# Patient Record
Sex: MALE | Race: WHITE | NOT HISPANIC OR LATINO | URBAN - METROPOLITAN AREA
[De-identification: names, ages, dates, MRNs, and addresses within clinical notes are randomized per-mention and may not be internally consistent; named-entity substitution may affect disease eponyms.]

---

## 2021-02-25 ENCOUNTER — OFFICE (OUTPATIENT)
Dept: URBAN - METROPOLITAN AREA CLINIC 75 | Facility: CLINIC | Age: 36
End: 2021-02-25
Payer: MEDICAID

## 2021-02-25 VITALS
OXYGEN SATURATION: 97 % | RESPIRATION RATE: 12 BRPM | WEIGHT: 302 LBS | TEMPERATURE: 97.3 F | HEART RATE: 85 BPM | HEIGHT: 70 IN

## 2021-02-25 DIAGNOSIS — R13.10 DYSPHAGIA, UNSPECIFIED: ICD-10-CM

## 2021-02-25 DIAGNOSIS — K21.9 GASTRO-ESOPHAGEAL REFLUX DISEASE WITHOUT ESOPHAGITIS: ICD-10-CM

## 2021-02-25 DIAGNOSIS — R11.10 VOMITING, UNSPECIFIED: ICD-10-CM

## 2021-02-25 DIAGNOSIS — K59.00 CONSTIPATION, UNSPECIFIED: ICD-10-CM

## 2021-02-25 DIAGNOSIS — R14.2 ERUCTATION: ICD-10-CM

## 2021-02-25 PROCEDURE — 99204 OFFICE O/P NEW MOD 45 MIN: CPT | Performed by: INTERNAL MEDICINE

## 2021-02-25 RX ORDER — PANTOPRAZOLE SODIUM 40 MG/1
TABLET, DELAYED RELEASE ORAL
Qty: 30 | Refills: 6 | Status: COMPLETED
End: 2021-07-29

## 2021-03-03 ENCOUNTER — TRANSCRIBE ORDERS (OUTPATIENT)
Dept: ADMINISTRATIVE | Facility: HOSPITAL | Age: 36
End: 2021-03-03

## 2021-03-03 DIAGNOSIS — K21.9 GASTRIC REFLUX: Primary | ICD-10-CM

## 2021-07-29 ENCOUNTER — OFFICE (OUTPATIENT)
Dept: URBAN - METROPOLITAN AREA CLINIC 75 | Facility: CLINIC | Age: 36
End: 2021-07-29
Payer: MEDICAID

## 2021-07-29 VITALS
HEIGHT: 70 IN | OXYGEN SATURATION: 91 % | WEIGHT: 291 LBS | HEART RATE: 82 BPM | SYSTOLIC BLOOD PRESSURE: 124 MMHG | TEMPERATURE: 97.3 F | DIASTOLIC BLOOD PRESSURE: 78 MMHG | RESPIRATION RATE: 100 BRPM

## 2021-07-29 DIAGNOSIS — R14.2 ERUCTATION: ICD-10-CM

## 2021-07-29 DIAGNOSIS — K21.9 GASTRO-ESOPHAGEAL REFLUX DISEASE WITHOUT ESOPHAGITIS: ICD-10-CM

## 2021-07-29 DIAGNOSIS — R14.0 ABDOMINAL DISTENSION (GASEOUS): ICD-10-CM

## 2021-07-29 DIAGNOSIS — K59.00 CONSTIPATION, UNSPECIFIED: ICD-10-CM

## 2021-07-29 PROCEDURE — 99214 OFFICE O/P EST MOD 30 MIN: CPT | Performed by: INTERNAL MEDICINE

## 2021-07-29 RX ORDER — PANTOPRAZOLE SODIUM 40 MG/1
TABLET, DELAYED RELEASE ORAL
Qty: 30 | Refills: 3 | Status: COMPLETED
End: 2023-10-05

## 2022-09-26 ENCOUNTER — HOSPITAL ENCOUNTER (EMERGENCY)
Facility: HOSPITAL | Age: 37
Discharge: HOME | End: 2022-09-26
Attending: EMERGENCY MEDICINE | Admitting: EMERGENCY MEDICINE

## 2022-09-26 ENCOUNTER — APPOINTMENT (OUTPATIENT)
Dept: CT IMAGING | Facility: HOSPITAL | Age: 37
End: 2022-09-26

## 2022-09-26 VITALS
TEMPERATURE: 98.3 F | SYSTOLIC BLOOD PRESSURE: 153 MMHG | WEIGHT: 290 LBS | RESPIRATION RATE: 18 BRPM | DIASTOLIC BLOOD PRESSURE: 92 MMHG | HEART RATE: 77 BPM | OXYGEN SATURATION: 96 % | BODY MASS INDEX: 41.52 KG/M2 | HEIGHT: 70 IN

## 2022-09-26 DIAGNOSIS — R10.9 ACUTE RIGHT FLANK PAIN: ICD-10-CM

## 2022-09-26 DIAGNOSIS — R91.1 LUNG NODULE: ICD-10-CM

## 2022-09-26 DIAGNOSIS — N20.0 KIDNEY STONES: Primary | ICD-10-CM

## 2022-09-26 LAB
ALBUMIN SERPL-MCNC: 4.3 G/DL (ref 3.5–5.2)
ALBUMIN/GLOB SERPL: 1.5 G/DL
ALP SERPL-CCNC: 68 U/L (ref 39–117)
ALT SERPL W P-5'-P-CCNC: 28 U/L (ref 1–41)
ANION GAP SERPL CALCULATED.3IONS-SCNC: 6.8 MMOL/L (ref 5–15)
AST SERPL-CCNC: 19 U/L (ref 1–40)
BACTERIA UR QL AUTO: ABNORMAL /HPF
BASOPHILS # BLD AUTO: 0.06 10*3/MM3 (ref 0–0.2)
BASOPHILS NFR BLD AUTO: 0.6 % (ref 0–1.5)
BILIRUB SERPL-MCNC: 0.5 MG/DL (ref 0–1.2)
BILIRUB UR QL STRIP: NEGATIVE
BUN SERPL-MCNC: 16 MG/DL (ref 6–20)
BUN/CREAT SERPL: 14.4 (ref 7–25)
CALCIUM SPEC-SCNC: 8.7 MG/DL (ref 8.6–10.5)
CHLORIDE SERPL-SCNC: 100 MMOL/L (ref 98–107)
CLARITY UR: CLEAR
CO2 SERPL-SCNC: 31.2 MMOL/L (ref 22–29)
COLOR UR: YELLOW
CREAT SERPL-MCNC: 1.11 MG/DL (ref 0.76–1.27)
DEPRECATED RDW RBC AUTO: 41.7 FL (ref 37–54)
EGFRCR SERPLBLD CKD-EPI 2021: 87.2 ML/MIN/1.73
EOSINOPHIL # BLD AUTO: 0.6 10*3/MM3 (ref 0–0.4)
EOSINOPHIL NFR BLD AUTO: 6.2 % (ref 0.3–6.2)
ERYTHROCYTE [DISTWIDTH] IN BLOOD BY AUTOMATED COUNT: 13.3 % (ref 12.3–15.4)
GLOBULIN UR ELPH-MCNC: 2.9 GM/DL
GLUCOSE SERPL-MCNC: 121 MG/DL (ref 65–99)
GLUCOSE UR STRIP-MCNC: NEGATIVE MG/DL
HCT VFR BLD AUTO: 43.5 % (ref 37.5–51)
HGB BLD-MCNC: 14.1 G/DL (ref 13–17.7)
HGB UR QL STRIP.AUTO: ABNORMAL
HOLD SPECIMEN: NORMAL
HOLD SPECIMEN: NORMAL
HYALINE CASTS UR QL AUTO: ABNORMAL /LPF
IMM GRANULOCYTES # BLD AUTO: 0.04 10*3/MM3 (ref 0–0.05)
IMM GRANULOCYTES NFR BLD AUTO: 0.4 % (ref 0–0.5)
KETONES UR QL STRIP: ABNORMAL
LEUKOCYTE ESTERASE UR QL STRIP.AUTO: ABNORMAL
LIPASE SERPL-CCNC: 17 U/L (ref 13–60)
LYMPHOCYTES # BLD AUTO: 2.46 10*3/MM3 (ref 0.7–3.1)
LYMPHOCYTES NFR BLD AUTO: 25.5 % (ref 19.6–45.3)
MCH RBC QN AUTO: 27.5 PG (ref 26.6–33)
MCHC RBC AUTO-ENTMCNC: 32.4 G/DL (ref 31.5–35.7)
MCV RBC AUTO: 84.8 FL (ref 79–97)
MONOCYTES # BLD AUTO: 0.53 10*3/MM3 (ref 0.1–0.9)
MONOCYTES NFR BLD AUTO: 5.5 % (ref 5–12)
NEUTROPHILS NFR BLD AUTO: 5.96 10*3/MM3 (ref 1.7–7)
NEUTROPHILS NFR BLD AUTO: 61.8 % (ref 42.7–76)
NITRITE UR QL STRIP: NEGATIVE
NRBC BLD AUTO-RTO: 0 /100 WBC (ref 0–0.2)
PH UR STRIP.AUTO: 6.5 [PH] (ref 5–8)
PLATELET # BLD AUTO: 196 10*3/MM3 (ref 140–450)
PMV BLD AUTO: 10.6 FL (ref 6–12)
POTASSIUM SERPL-SCNC: 4.2 MMOL/L (ref 3.5–5.2)
PROT SERPL-MCNC: 7.2 G/DL (ref 6–8.5)
PROT UR QL STRIP: ABNORMAL
RBC # BLD AUTO: 5.13 10*6/MM3 (ref 4.14–5.8)
RBC # UR STRIP: ABNORMAL /HPF
REF LAB TEST METHOD: ABNORMAL
SODIUM SERPL-SCNC: 138 MMOL/L (ref 136–145)
SP GR UR STRIP: >=1.03 (ref 1–1.03)
SQUAMOUS #/AREA URNS HPF: ABNORMAL /HPF
UROBILINOGEN UR QL STRIP: ABNORMAL
WBC # UR STRIP: ABNORMAL /HPF
WBC NRBC COR # BLD: 9.65 10*3/MM3 (ref 3.4–10.8)
WHOLE BLOOD HOLD COAG: NORMAL
WHOLE BLOOD HOLD SPECIMEN: NORMAL

## 2022-09-26 PROCEDURE — 74176 CT ABD & PELVIS W/O CONTRAST: CPT

## 2022-09-26 PROCEDURE — 80053 COMPREHEN METABOLIC PANEL: CPT | Performed by: EMERGENCY MEDICINE

## 2022-09-26 PROCEDURE — 83690 ASSAY OF LIPASE: CPT | Performed by: EMERGENCY MEDICINE

## 2022-09-26 PROCEDURE — 81001 URINALYSIS AUTO W/SCOPE: CPT | Performed by: EMERGENCY MEDICINE

## 2022-09-26 PROCEDURE — 99283 EMERGENCY DEPT VISIT LOW MDM: CPT

## 2022-09-26 PROCEDURE — 85025 COMPLETE CBC W/AUTO DIFF WBC: CPT | Performed by: EMERGENCY MEDICINE

## 2022-09-26 RX ORDER — ONDANSETRON 4 MG/1
TABLET, ORALLY DISINTEGRATING ORAL
Qty: 20 TABLET | Refills: 0 | Status: SHIPPED | OUTPATIENT
Start: 2022-09-26

## 2022-09-26 RX ORDER — SODIUM CHLORIDE 0.9 % (FLUSH) 0.9 %
10 SYRINGE (ML) INJECTION AS NEEDED
Status: DISCONTINUED | OUTPATIENT
Start: 2022-09-26 | End: 2022-09-27 | Stop reason: HOSPADM

## 2022-09-27 NOTE — ED PROVIDER NOTES
EMERGENCY DEPARTMENT ENCOUNTER    Room Number:  22/22  Date of encounter:  9/26/2022  PCP: Jamilah Hernandez APRN  Historian: Patient    Patient was placed in face mask during triage process. Patient was wearing facemask when I entered the room and throughout our encounter. I wore full protective equipment throughout this patient encounter including a face mask, eye protection, and gloves. Hand hygiene was performed before donning protective equipment and again following doffing of PPE after leaving the room.    HPI:  Chief Complaint: Right flank pain  A complete HPI/ROS/PMH/PSH/SH/FH are unobtainable due to: N/A   Context: Dale Marr is a 37 y.o. male who presents to the ED c/o waxing waning right flank pain for nearly a month.  Occasionally radiates into right groin and testicle.  No reported testicular swelling.  Some urinary urgency/dysuria.  No known trauma.  No nausea or vomiting at this time.  No fevers.  Discomfort currently very mild.  No clear exacerbating or relieving factors identified.  No history of similar before 1 month ago.  Patient denies any penile discharge, testicular swelling or mass, history of STDs or exposures to STDs or discomfort with ejaculation.      MEDICAL HISTORY REVIEW  EMR reviewed:    PAST MEDICAL HISTORY  Active Ambulatory Problems     Diagnosis Date Noted   • No Active Ambulatory Problems     Resolved Ambulatory Problems     Diagnosis Date Noted   • No Resolved Ambulatory Problems     Past Medical History:   Diagnosis Date   • Asthma    • GERD (gastroesophageal reflux disease)          PAST SURGICAL HISTORY  Past Surgical History:   Procedure Laterality Date   • APPENDECTOMY           FAMILY HISTORY  No family history on file.      SOCIAL HISTORY  Social History     Socioeconomic History   • Marital status: Single   Tobacco Use   • Smoking status: Never Smoker   Substance and Sexual Activity   • Alcohol use: No   • Drug use: Yes     Types: Marijuana     Comment: Heroin  "\"that I snort\"    • Sexual activity: Defer         ALLERGIES  Patient has no known allergies.        REVIEW OF SYSTEMS  Review of Systems     All systems reviewed and negative except for those discussed in HPI.       PHYSICAL EXAM    I have reviewed the triage vital signs and nursing notes.    ED Triage Vitals [09/26/22 2110]   Temp Heart Rate Resp BP SpO2   98.3 °F (36.8 °C) 81 18 141/83 96 %      Temp src Heart Rate Source Patient Position BP Location FiO2 (%)   Oral Monitor Standing Right arm --       Physical Exam    Physical Exam   Constitutional: No distress.  Nontoxic.  Not overtly uncomfortable appearing   HENT:  Head: Normocephalic and atraumatic.   Oropharynx: Mucous membranes are moist.   Eyes: No scleral icterus. No conjunctival pallor.  Neck: Painless range of motion noted. Neck supple.   Cardiovascular: Normal rate, regular rhythm and intact distal pulses.  Pulmonary/Chest: No respiratory distress. There are no wheezes, no rhonchi, and no rales.   Abdominal: Large habitus, soft without focal tenderness.  No rebound or rigidity.  : Normal external anatomy.  No penile discharge or lesions.  No testicular tenderness, swelling, or masses.  No inguinal hernia appreciated.  No tenderness with deep palpation over the perineum to suggest acute prostatitis.  Musculoskeletal: Moves all extremities equally. There is no pedal edema or calf tenderness.   Neurological: Alert.  Baseline strength and sensation noted.   Skin: Skin is pink, warm, and dry. No pallor.   Psychiatric: Mood and affect normal.   Nursing note and vitals reviewed.    LAB RESULTS  Recent Results (from the past 24 hour(s))   Comprehensive Metabolic Panel    Collection Time: 09/26/22  9:54 PM    Specimen: Blood   Result Value Ref Range    Glucose 121 (H) 65 - 99 mg/dL    BUN 16 6 - 20 mg/dL    Creatinine 1.11 0.76 - 1.27 mg/dL    Sodium 138 136 - 145 mmol/L    Potassium 4.2 3.5 - 5.2 mmol/L    Chloride 100 98 - 107 mmol/L    CO2 31.2 (H) 22.0 - " 29.0 mmol/L    Calcium 8.7 8.6 - 10.5 mg/dL    Total Protein 7.2 6.0 - 8.5 g/dL    Albumin 4.30 3.50 - 5.20 g/dL    ALT (SGPT) 28 1 - 41 U/L    AST (SGOT) 19 1 - 40 U/L    Alkaline Phosphatase 68 39 - 117 U/L    Total Bilirubin 0.5 0.0 - 1.2 mg/dL    Globulin 2.9 gm/dL    A/G Ratio 1.5 g/dL    BUN/Creatinine Ratio 14.4 7.0 - 25.0    Anion Gap 6.8 5.0 - 15.0 mmol/L    eGFR 87.2 >60.0 mL/min/1.73   Lipase    Collection Time: 09/26/22  9:54 PM    Specimen: Blood   Result Value Ref Range    Lipase 17 13 - 60 U/L   Green Top (Gel)    Collection Time: 09/26/22  9:54 PM   Result Value Ref Range    Extra Tube Hold for add-ons.    Lavender Top    Collection Time: 09/26/22  9:54 PM   Result Value Ref Range    Extra Tube hold for add-on    Gold Top - SST    Collection Time: 09/26/22  9:54 PM   Result Value Ref Range    Extra Tube Hold for add-ons.    Light Blue Top    Collection Time: 09/26/22  9:54 PM   Result Value Ref Range    Extra Tube Hold for add-ons.    CBC Auto Differential    Collection Time: 09/26/22  9:54 PM    Specimen: Blood   Result Value Ref Range    WBC 9.65 3.40 - 10.80 10*3/mm3    RBC 5.13 4.14 - 5.80 10*6/mm3    Hemoglobin 14.1 13.0 - 17.7 g/dL    Hematocrit 43.5 37.5 - 51.0 %    MCV 84.8 79.0 - 97.0 fL    MCH 27.5 26.6 - 33.0 pg    MCHC 32.4 31.5 - 35.7 g/dL    RDW 13.3 12.3 - 15.4 %    RDW-SD 41.7 37.0 - 54.0 fl    MPV 10.6 6.0 - 12.0 fL    Platelets 196 140 - 450 10*3/mm3    Neutrophil % 61.8 42.7 - 76.0 %    Lymphocyte % 25.5 19.6 - 45.3 %    Monocyte % 5.5 5.0 - 12.0 %    Eosinophil % 6.2 0.3 - 6.2 %    Basophil % 0.6 0.0 - 1.5 %    Immature Grans % 0.4 0.0 - 0.5 %    Neutrophils, Absolute 5.96 1.70 - 7.00 10*3/mm3    Lymphocytes, Absolute 2.46 0.70 - 3.10 10*3/mm3    Monocytes, Absolute 0.53 0.10 - 0.90 10*3/mm3    Eosinophils, Absolute 0.60 (H) 0.00 - 0.40 10*3/mm3    Basophils, Absolute 0.06 0.00 - 0.20 10*3/mm3    Immature Grans, Absolute 0.04 0.00 - 0.05 10*3/mm3    nRBC 0.0 0.0 - 0.2 /100 WBC    Urinalysis With Microscopic If Indicated (No Culture) - Urine, Clean Catch    Collection Time: 09/26/22  9:55 PM    Specimen: Urine, Clean Catch   Result Value Ref Range    Color, UA Yellow Yellow, Straw    Appearance, UA Clear Clear    pH, UA 6.5 5.0 - 8.0    Specific Gravity, UA >=1.030 1.005 - 1.030    Glucose, UA Negative Negative    Ketones, UA Trace (A) Negative    Bilirubin, UA Negative Negative    Blood, UA Large (3+) (A) Negative    Protein, UA Trace (A) Negative    Leuk Esterase, UA Trace (A) Negative    Nitrite, UA Negative Negative    Urobilinogen, UA 1.0 E.U./dL 0.2 - 1.0 E.U./dL   Urinalysis, Microscopic Only - Urine, Clean Catch    Collection Time: 09/26/22  9:55 PM    Specimen: Urine, Clean Catch   Result Value Ref Range    RBC, UA Too Numerous to Count (A) None Seen, 0-2 /HPF    WBC, UA 6-12 (A) None Seen, 0-2 /HPF    Bacteria, UA None Seen None Seen /HPF    Squamous Epithelial Cells, UA 3-6 (A) None Seen, 0-2 /HPF    Hyaline Casts, UA 3-6 None Seen /LPF    Methodology Automated Microscopy        Ordered the above labs and independently reviewed the results.        RADIOLOGY  CT Abdomen Pelvis Stone Protocol    Result Date: 9/26/2022  CT ABDOMEN AND PELVIS WITHOUT IV CONTRAST  HISTORY: Right flank pain with radiating symptoms into the groin  TECHNIQUE:  CT includes axial imaging from the lung bases to the trochanters without intravenous contrast and without use of oral contrast. Data reconstructed in coronal and sagittal planes. Radiation dose reduction techniques were utilized, including automated exposure control and exposure modulation based on body size.  COMPARISON: None  FINDINGS: Within the left lower lobe there is a noncalcified nodule that measures 9 mm. Lung bases are otherwise clear.  There is diffuse fat infiltration of the liver. Gallbladder, spleen, adrenal glands, partially fat replaced pancreas exhibit normal noncontrasted CT appearance. Left kidney appears normal. There are 2  adjacent right mid to lower pole 4 mm and 2 mm stones. There is a 1 mm right lower pole renal stone. There is no ureteral stone or hydronephrosis. The urinary bladder is mostly decompressed.  There is no jeffery enlargement in the abdomen or pelvis. There is no bowel dilatation or evidence for bowel obstruction. There has been previous appendectomy. There is an old healed fracture of the right lateral 9th rib.      1. 9 mm left lower lobe noncalcified pulmonary nodule for which further evaluation with chest CT is recommended. 2. Small nonobstructing right renal stones. No ureteral stone or evidence for obstructive uropathy. 3. Diffuse fat infiltration of the liver. 4. Previous appendectomy.  Discussed with Dr. Villasenor in the emergency department on 09/26/2022 at 10:30 PM.  Radiation dose reduction techniques were utilized, including automated exposure control and exposure modulation based on body size.  This report was finalized on 9/26/2022 10:58 PM by Dr. Khris Zaragoza M.D.        I ordered the above noted radiological studies. Reviewed by me and discussed with radiologist.  See dictation for official radiology interpretation.      PROCEDURES    Procedures        MEDICATIONS GIVEN IN ER    Medications   sodium chloride 0.9 % flush 10 mL (has no administration in time range)         PROGRESS, DATA ANALYSIS, CONSULTS, AND MEDICAL DECISION MAKING    My differential diagnosis for back pain includes but is not limited to:  Musculoskeletal strain, contusion, retroperitoneal hematoma, disc protrusion, vertebral fracture, transverse process fracture, rib fracture, facet syndrome, sacroiliac joint strain, sciatica, renal injury, splenic injury, pancreatic injury, osteoarthritis, lumbar spondylosis, spinal stenosis, ankylosing spondylitis, sacroiliac joint inflammation, pancreatitis, perforated peptic ulcer, diverticulitis, endometriosis, chronic PID, epidural abscess, osteomyelitis, retroperitoneal abscess, pyelonephritis,  pneumonia, subphrenic abscess, tuberculosis, neurofibroma, meningioma, multiple myeloma, lymphoma, metastatic cancer, primary cancer, AAA, aortic dissection, spinal ischemia, referred pain, ureterolithiasis    My differential diagnosis for abdominal pain includes but is not limited to:  Gastritis, gastroenteritis, peptic ulcer disease, GERD, esophageal perforation, acute appendicitis, mesenteric adenitis, Meckel’s diverticulum, epiploic appendagitis, diverticulitis, colon cancer, ulcerative colitis, Crohn’s disease, intussusception, small bowel obstruction, adhesions, ischemic bowel, perforated viscus, ileus, obstipation, biliary colic, cholecystitis, cholelithiasis, Ryne-Blaise Jose, hepatitis, pancreatitis, common bile duct obstruction, cholangitis, bile leak, splenic trauma, splenic rupture, splenic infarction, splenic abscess, abdominal abscess, ascites, spontaneous bacterial peritonitis, hernia, UTI, cystitis, prostatitis, ureterolithiasis, urinary obstruction, AAA, myocardial infarction, pneumonia, cancer, porphyria, DKA, medications, sickle cell, viral syndrome, zoster      All labs have been independently reviewed by me.  All radiology studies have been reviewed by me and discussed with radiologist dictating the report.   EKG's independently viewed and interpreted by me.  Discussion below represents my analysis of pertinent findings related to patient's condition, differential diagnosis, treatment plan and final disposition.      ED Course as of 09/26/22 2306   Mon Sep 26, 2022   2233 CT abdomen pelvis reviewed with Dr. Zaragoza, radiologist.  A few small intrarenal stones on the right with no ureteral stone, hydronephrosis or hydroureter noted.  Patient has a fatty liver.  Also noted 9 mm left lower lobe lung nodule for which outpatient follow-up was recommended. [RS]   2234 RBC, UA(!): Too Numerous to Count [RS]   2234 Blood, UA(!): Large (3+) [RS]   2234 Glucose(!): 121 [RS]   2234 BUN: 16 [RS]   2234  Creatinine: 1.11 [RS]   2234 Sodium: 138 [RS]   2234 Potassium: 4.2 [RS]   2234 WBC: 9.65 [RS]   2234 Hemoglobin: 14.1 [RS]   2247 Patient certainly tells a story for passing small kidney stones.  I will recommend ibuprofen as needed for pain, prescription for Zofran for intermittent nausea, straining of urine to see if we can catch a stone, outpatient follow-up with urology.  Patient agreeable discharge planning. [RS]      ED Course User Index  [RS] Alexander Villasenor MD       AS OF 23:06 EDT VITALS:    BP - 153/92  HR - 77  TEMP - 98.3 °F (36.8 °C) (Oral)  O2 SATS - 96%        DIAGNOSIS  Final diagnoses:   Kidney stones   Acute right flank pain   Lung nodule         DISPOSITION  DISCHARGE    Patient discharged in stable condition.    Reviewed implications of results, diagnosis, meds, responsibility to follow up, warning signs and symptoms of possible worsening, potential complications and reasons to return to ER.    Patient/Family voiced understanding of above instructions.    Discussed plan for discharge, as there is no emergent indication for admission. Patient referred to primary care provider for regular health maintenance. Pt/family is agreeable and understands need for follow up and possible repeat testing.  Pt is aware that discharge does not mean that nothing is wrong but it indicates no emergency is present that requires admission and they must continue care with follow-up as given below or physician of their choice.     FOLLOW-UP  Jamilah Hernandez, APRN  [address] [facility] [id number] [phone]    Schedule an appointment as soon as possible for a visit in 1 week  Further discussion and planning for follow-up on lung nodule    Bob Abbott Jr., MD  [address] [facility] [id number] [phone]    Schedule an appointment as soon as possible for a visit in 2 weeks  Next available         Medication List      New Prescriptions    ondansetron ODT 4 MG disintegrating  tablet  Commonly known as: Zofran ODT  Take one tablet by mouth every 6 hours as needed for nausea and vomiting           Where to Get Your Medications      You can get these medications from any pharmacy    Bring a paper prescription for each of these medications  · ondansetron ODT 4 MG disintegrating tablet            Alexander Villasenor MD  09/26/22 3449       Alexander Villasenor MD  09/26/22 0129

## 2022-09-27 NOTE — ED NOTES
Pt arrived to ER from home. Pt reports having intermittent testicle pain. Pt reports that he is has trouble peeing. Pt states that his urine stops and starts and cannot empty bladder fully. Pt reports lower back pain bilaterally. Pt reports burning during urination. These symptoms have been going on for the last several days.

## 2022-11-03 ENCOUNTER — TRANSCRIBE ORDERS (OUTPATIENT)
Dept: ADMINISTRATIVE | Facility: HOSPITAL | Age: 37
End: 2022-11-03

## 2022-11-03 DIAGNOSIS — R91.1 PULMONARY NODULE: Primary | ICD-10-CM

## 2022-11-17 ENCOUNTER — APPOINTMENT (OUTPATIENT)
Dept: CT IMAGING | Facility: HOSPITAL | Age: 37
End: 2022-11-17

## 2023-10-10 ENCOUNTER — PRE-ADMISSION TESTING (OUTPATIENT)
Dept: PREADMISSION TESTING | Facility: HOSPITAL | Age: 38
End: 2023-10-10
Payer: MEDICAID

## 2023-10-10 VITALS
TEMPERATURE: 97.4 F | BODY MASS INDEX: 44.07 KG/M2 | HEIGHT: 70 IN | RESPIRATION RATE: 16 BRPM | WEIGHT: 307.8 LBS | DIASTOLIC BLOOD PRESSURE: 74 MMHG | OXYGEN SATURATION: 97 % | SYSTOLIC BLOOD PRESSURE: 118 MMHG | HEART RATE: 72 BPM

## 2023-10-10 LAB
ANION GAP SERPL CALCULATED.3IONS-SCNC: 11.7 MMOL/L (ref 5–15)
BUN SERPL-MCNC: 12 MG/DL (ref 6–20)
BUN/CREAT SERPL: 10.9 (ref 7–25)
CALCIUM SPEC-SCNC: 8.9 MG/DL (ref 8.6–10.5)
CHLORIDE SERPL-SCNC: 100 MMOL/L (ref 98–107)
CO2 SERPL-SCNC: 30.3 MMOL/L (ref 22–29)
CREAT SERPL-MCNC: 1.1 MG/DL (ref 0.76–1.27)
DEPRECATED RDW RBC AUTO: 35.6 FL (ref 37–54)
EGFRCR SERPLBLD CKD-EPI 2021: 87.6 ML/MIN/1.73
ERYTHROCYTE [DISTWIDTH] IN BLOOD BY AUTOMATED COUNT: 12.6 % (ref 12.3–15.4)
GLUCOSE SERPL-MCNC: 87 MG/DL (ref 65–99)
HCT VFR BLD AUTO: 43.7 % (ref 37.5–51)
HGB BLD-MCNC: 14.6 G/DL (ref 13–17.7)
MCH RBC QN AUTO: 26.7 PG (ref 26.6–33)
MCHC RBC AUTO-ENTMCNC: 33.4 G/DL (ref 31.5–35.7)
MCV RBC AUTO: 80 FL (ref 79–97)
PLATELET # BLD AUTO: 224 10*3/MM3 (ref 140–450)
PMV BLD AUTO: 10.3 FL (ref 6–12)
POTASSIUM SERPL-SCNC: 4 MMOL/L (ref 3.5–5.2)
RBC # BLD AUTO: 5.46 10*6/MM3 (ref 4.14–5.8)
SODIUM SERPL-SCNC: 142 MMOL/L (ref 136–145)
WBC NRBC COR # BLD: 8.7 10*3/MM3 (ref 3.4–10.8)

## 2023-10-10 PROCEDURE — 85027 COMPLETE CBC AUTOMATED: CPT

## 2023-10-10 PROCEDURE — 36415 COLL VENOUS BLD VENIPUNCTURE: CPT

## 2023-10-10 PROCEDURE — 80048 BASIC METABOLIC PNL TOTAL CA: CPT

## 2023-10-10 RX ORDER — KETOCONAZOLE 20 MG/G
CREAM TOPICAL DAILY PRN
COMMUNITY
Start: 2023-10-01

## 2023-10-10 RX ORDER — HYDROXYZINE PAMOATE 25 MG/1
25 CAPSULE ORAL 3 TIMES DAILY PRN
COMMUNITY
Start: 2023-06-30

## 2023-10-10 RX ORDER — ALBUTEROL SULFATE 90 UG/1
2 AEROSOL, METERED RESPIRATORY (INHALATION) EVERY 4 HOURS PRN
COMMUNITY
Start: 2023-06-30 | End: 2023-10-10

## 2023-10-10 RX ORDER — HYDROCHLOROTHIAZIDE 12.5 MG/1
12.5 CAPSULE, GELATIN COATED ORAL EVERY MORNING
COMMUNITY
Start: 2023-06-30

## 2023-10-10 RX ORDER — BUSPIRONE HYDROCHLORIDE 10 MG/1
10 TABLET ORAL 3 TIMES DAILY PRN
COMMUNITY

## 2023-10-10 RX ORDER — PANTOPRAZOLE SODIUM 40 MG/1
40 TABLET, DELAYED RELEASE ORAL DAILY
COMMUNITY
Start: 2023-09-28

## 2023-10-10 NOTE — DISCHARGE INSTRUCTIONS
Take the following medications the morning of surgery with a small sip of water:  PANTOPRAZOLE    If you are on prescription narcotic pain medication to control your pain you may also take that medication the morning of surgery.    General Instructions:  NOTHING TO EAT OR DRINK 8 HOURS PRIOR TO YOUR ARRIVAL TIME  Infants may have breast milk up to four hours before surgery.  Infants drinking formula may drink formula up to six hours before surgery.   Patients who avoid smoking, chewing tobacco and alcohol for 4 weeks prior to surgery have a reduced risk of post-operative complications.  Quit smoking as many days before surgery as you can.  Do not smoke, use chewing tobacco or drink alcohol the day of surgery.   If applicable bring your C-PAP/ BI-PAP machine in with you to preop day of surgery.  Bring any papers given to you in the doctor's office.  Wear clean comfortable clothes.  Do not wear contact lenses, false eyelashes or make-up.  Bring a case for your glasses.   Bring crutches or walker if applicable.  Remove all piercings.  Leave jewelry and any other valuables at home.  Hair extensions with metal clips must be removed prior to surgery.  The Pre-Admission Testing nurse will instruct you to bring medications if unable to obtain an accurate list in Pre-Admission Testing.        If you were given a blood bank ID arm band remember to bring it with you the day of surgery.    Preventing a Surgical Site Infection:  For 2 to 3 days before surgery, avoid shaving with a razor because the razor can irritate skin and make it easier to develop an infection.    Any areas of open skin can increase the risk of a post-operative wound infection by allowing bacteria to enter and travel throughout the body.  Notify your surgeon if you have any skin wounds / rashes even if it is not near the expected surgical site.  The area will need assessed to determine if surgery should be delayed until it is healed.  The night prior to  surgery shower using a fresh bar of anti-bacterial soap (such as Dial) and clean washcloth.  Sleep in a clean bed with clean clothing.  Do not allow pets to sleep with you.  Shower on the morning of surgery using a fresh bar of anti-bacterial soap (such as Dial) and clean washcloth.  Dry with a clean towel and dress in clean clothing.  Ask your surgeon if you will be receiving antibiotics prior to surgery.  Make sure you, your family, and all healthcare providers clean their hands with soap and water or an alcohol based hand  before caring for you or your wound.    Day of surgery: 10/16/2023 ARRIVAL TIME  12:00 PM  Your arrival time is approximately two hours before your scheduled surgery time.  Upon arrival, a Pre-op nurse and Anesthesiologist will review your health history, obtain vital signs, and answer questions you may have.  The only belongings needed at this time will be your home medications and if applicable your C-PAP/BI-PAP machine.  A Pre-op nurse will start an IV and you may receive medication in preparation for surgery, including something to help you relax.      Please be aware that surgery does come with discomfort.  We want to make every effort to control your discomfort so please discuss any uncontrolled symptoms with your nurse.   Your doctor will most likely have prescribed pain medications.      If you are going home after surgery you will receive individualized written care instructions before being discharged.  A responsible adult must drive you to and from the hospital on the day of your surgery and stay with you for 24 hours.  Discharge prescriptions can be filled by the hospital pharmacy during regular pharmacy hours.  If you are having surgery late in the day/evening your prescription may be e-prescribed to your pharmacy.  Please verify your pharmacy hours or chose a 24 hour pharmacy to avoid not having access to your prescription because your pharmacy has closed for the  day.    If you are staying overnight following surgery, you will be transported to your hospital room following the recovery period.  [facility] has all private rooms.    If you have any questions please call Pre-Admission Testing at [phone].  Deductibles and co-payments are collected on the day of service. Please be prepared to pay the required co-pay, deductible or deposit on the day of service as defined by your plan.    Call your surgeon immediately if you experience any of the following symptoms:  Sore Throat  Shortness of Breath or difficulty breathing  Cough  Chills  Body soreness or muscle pain  Headache  Fever  New loss of taste or smell  Do not arrive for your surgery ill.  Your procedure will need to be rescheduled to another time.  You will need to call your physician before the day of surgery to avoid any unnecessary exposure to hospital staff as well as other patients.

## 2023-10-16 ENCOUNTER — ANESTHESIA EVENT (OUTPATIENT)
Dept: PERIOP | Facility: HOSPITAL | Age: 38
End: 2023-10-16
Payer: MEDICAID

## 2023-10-16 ENCOUNTER — ANESTHESIA (OUTPATIENT)
Dept: PERIOP | Facility: HOSPITAL | Age: 38
End: 2023-10-16
Payer: MEDICAID

## 2023-10-16 ENCOUNTER — HOSPITAL ENCOUNTER (OUTPATIENT)
Facility: HOSPITAL | Age: 38
Setting detail: HOSPITAL OUTPATIENT SURGERY
Discharge: HOME | End: 2023-10-16
Attending: SURGERY | Admitting: SURGERY
Payer: MEDICAID

## 2023-10-16 VITALS
SYSTOLIC BLOOD PRESSURE: 127 MMHG | TEMPERATURE: 97.6 F | RESPIRATION RATE: 16 BRPM | DIASTOLIC BLOOD PRESSURE: 80 MMHG | OXYGEN SATURATION: 98 % | HEART RATE: 76 BPM

## 2023-10-16 DIAGNOSIS — N20.0 RIGHT RENAL STONE: Primary | ICD-10-CM

## 2023-10-16 PROCEDURE — 25010000002 CEFAZOLIN PER 500 MG: Performed by: SURGERY

## 2023-10-16 PROCEDURE — 25010000002 DEXAMETHASONE SODIUM PHOSPHATE 20 MG/5ML SOLUTION

## 2023-10-16 PROCEDURE — 25010000002 ONDANSETRON PER 1 MG

## 2023-10-16 PROCEDURE — 25010000002 PROPOFOL 200 MG/20ML EMULSION

## 2023-10-16 PROCEDURE — 25810000003 LACTATED RINGERS PER 1000 ML: Performed by: ANESTHESIOLOGY

## 2023-10-16 RX ORDER — NALOXONE HCL 0.4 MG/ML
0.2 VIAL (ML) INJECTION AS NEEDED
Status: DISCONTINUED | OUTPATIENT
Start: 2023-10-16 | End: 2023-10-16 | Stop reason: HOSPADM

## 2023-10-16 RX ORDER — HYDROCODONE BITARTRATE AND ACETAMINOPHEN 5; 325 MG/1; MG/1
1 TABLET ORAL EVERY 6 HOURS PRN
Qty: 8 TABLET | Refills: 0 | Status: SHIPPED | OUTPATIENT
Start: 2023-10-16

## 2023-10-16 RX ORDER — GLYCOPYRROLATE 0.2 MG/ML
INJECTION INTRAMUSCULAR; INTRAVENOUS AS NEEDED
Status: DISCONTINUED | OUTPATIENT
Start: 2023-10-16 | End: 2023-10-16 | Stop reason: SURG

## 2023-10-16 RX ORDER — LABETALOL HYDROCHLORIDE 5 MG/ML
5 INJECTION, SOLUTION INTRAVENOUS
Status: DISCONTINUED | OUTPATIENT
Start: 2023-10-16 | End: 2023-10-16 | Stop reason: HOSPADM

## 2023-10-16 RX ORDER — IPRATROPIUM BROMIDE AND ALBUTEROL SULFATE 2.5; .5 MG/3ML; MG/3ML
3 SOLUTION RESPIRATORY (INHALATION) ONCE AS NEEDED
Status: DISCONTINUED | OUTPATIENT
Start: 2023-10-16 | End: 2023-10-16 | Stop reason: HOSPADM

## 2023-10-16 RX ORDER — SODIUM CHLORIDE 0.9 % (FLUSH) 0.9 %
3 SYRINGE (ML) INJECTION EVERY 12 HOURS SCHEDULED
Status: DISCONTINUED | OUTPATIENT
Start: 2023-10-16 | End: 2023-10-16 | Stop reason: HOSPADM

## 2023-10-16 RX ORDER — PROPOFOL 10 MG/ML
INJECTION, EMULSION INTRAVENOUS AS NEEDED
Status: DISCONTINUED | OUTPATIENT
Start: 2023-10-16 | End: 2023-10-16 | Stop reason: SURG

## 2023-10-16 RX ORDER — DEXAMETHASONE SODIUM PHOSPHATE 4 MG/ML
INJECTION, SOLUTION INTRA-ARTICULAR; INTRALESIONAL; INTRAMUSCULAR; INTRAVENOUS; SOFT TISSUE AS NEEDED
Status: DISCONTINUED | OUTPATIENT
Start: 2023-10-16 | End: 2023-10-16 | Stop reason: SURG

## 2023-10-16 RX ORDER — PROMETHAZINE HYDROCHLORIDE 25 MG/1
25 TABLET ORAL ONCE AS NEEDED
Status: DISCONTINUED | OUTPATIENT
Start: 2023-10-16 | End: 2023-10-16 | Stop reason: HOSPADM

## 2023-10-16 RX ORDER — ONDANSETRON 2 MG/ML
4 INJECTION INTRAMUSCULAR; INTRAVENOUS ONCE AS NEEDED
Status: DISCONTINUED | OUTPATIENT
Start: 2023-10-16 | End: 2023-10-16 | Stop reason: HOSPADM

## 2023-10-16 RX ORDER — ACETAMINOPHEN 500 MG
1000 TABLET ORAL ONCE
Status: DISCONTINUED | OUTPATIENT
Start: 2023-10-16 | End: 2023-10-16 | Stop reason: HOSPADM

## 2023-10-16 RX ORDER — DIPHENHYDRAMINE HYDROCHLORIDE 50 MG/ML
12.5 INJECTION INTRAMUSCULAR; INTRAVENOUS
Status: DISCONTINUED | OUTPATIENT
Start: 2023-10-16 | End: 2023-10-16 | Stop reason: HOSPADM

## 2023-10-16 RX ORDER — DROPERIDOL 2.5 MG/ML
0.62 INJECTION, SOLUTION INTRAMUSCULAR; INTRAVENOUS
Status: DISCONTINUED | OUTPATIENT
Start: 2023-10-16 | End: 2023-10-16 | Stop reason: HOSPADM

## 2023-10-16 RX ORDER — FENTANYL CITRATE 50 UG/ML
50 INJECTION, SOLUTION INTRAMUSCULAR; INTRAVENOUS
Status: DISCONTINUED | OUTPATIENT
Start: 2023-10-16 | End: 2023-10-16 | Stop reason: HOSPADM

## 2023-10-16 RX ORDER — TAMSULOSIN HYDROCHLORIDE 0.4 MG/1
1 CAPSULE ORAL DAILY
Qty: 14 CAPSULE | Refills: 0 | Status: SHIPPED | OUTPATIENT
Start: 2023-10-16

## 2023-10-16 RX ORDER — HYDROCODONE BITARTRATE AND ACETAMINOPHEN 7.5; 325 MG/1; MG/1
1 TABLET ORAL ONCE AS NEEDED
Status: DISCONTINUED | OUTPATIENT
Start: 2023-10-16 | End: 2023-10-16 | Stop reason: HOSPADM

## 2023-10-16 RX ORDER — SODIUM CHLORIDE 0.9 % (FLUSH) 0.9 %
3-10 SYRINGE (ML) INJECTION AS NEEDED
Status: DISCONTINUED | OUTPATIENT
Start: 2023-10-16 | End: 2023-10-16 | Stop reason: HOSPADM

## 2023-10-16 RX ORDER — PROMETHAZINE HYDROCHLORIDE 25 MG/1
25 SUPPOSITORY RECTAL ONCE AS NEEDED
Status: DISCONTINUED | OUTPATIENT
Start: 2023-10-16 | End: 2023-10-16 | Stop reason: HOSPADM

## 2023-10-16 RX ORDER — SODIUM CHLORIDE, SODIUM LACTATE, POTASSIUM CHLORIDE, CALCIUM CHLORIDE 600; 310; 30; 20 MG/100ML; MG/100ML; MG/100ML; MG/100ML
9 INJECTION, SOLUTION INTRAVENOUS CONTINUOUS
Status: DISCONTINUED | OUTPATIENT
Start: 2023-10-16 | End: 2023-10-16 | Stop reason: HOSPADM

## 2023-10-16 RX ORDER — HYDROMORPHONE HYDROCHLORIDE 1 MG/ML
0.5 INJECTION, SOLUTION INTRAMUSCULAR; INTRAVENOUS; SUBCUTANEOUS
Status: DISCONTINUED | OUTPATIENT
Start: 2023-10-16 | End: 2023-10-16 | Stop reason: HOSPADM

## 2023-10-16 RX ORDER — CEFAZOLIN SODIUM IN 0.9 % NACL 3 G/100 ML
3000 INTRAVENOUS SOLUTION, PIGGYBACK (ML) INTRAVENOUS ONCE
Status: COMPLETED | OUTPATIENT
Start: 2023-10-16 | End: 2023-10-16

## 2023-10-16 RX ORDER — LIDOCAINE HYDROCHLORIDE 20 MG/ML
INJECTION, SOLUTION INFILTRATION; PERINEURAL AS NEEDED
Status: DISCONTINUED | OUTPATIENT
Start: 2023-10-16 | End: 2023-10-16 | Stop reason: SURG

## 2023-10-16 RX ORDER — FAMOTIDINE 10 MG/ML
20 INJECTION, SOLUTION INTRAVENOUS ONCE
Status: COMPLETED | OUTPATIENT
Start: 2023-10-16 | End: 2023-10-16

## 2023-10-16 RX ORDER — FLUMAZENIL 0.1 MG/ML
0.2 INJECTION INTRAVENOUS AS NEEDED
Status: DISCONTINUED | OUTPATIENT
Start: 2023-10-16 | End: 2023-10-16 | Stop reason: HOSPADM

## 2023-10-16 RX ORDER — EPHEDRINE SULFATE 50 MG/ML
5 INJECTION, SOLUTION INTRAVENOUS ONCE AS NEEDED
Status: DISCONTINUED | OUTPATIENT
Start: 2023-10-16 | End: 2023-10-16 | Stop reason: HOSPADM

## 2023-10-16 RX ORDER — OXYCODONE AND ACETAMINOPHEN 7.5; 325 MG/1; MG/1
1 TABLET ORAL EVERY 4 HOURS PRN
Status: DISCONTINUED | OUTPATIENT
Start: 2023-10-16 | End: 2023-10-16 | Stop reason: HOSPADM

## 2023-10-16 RX ORDER — HYDRALAZINE HYDROCHLORIDE 20 MG/ML
5 INJECTION INTRAMUSCULAR; INTRAVENOUS
Status: DISCONTINUED | OUTPATIENT
Start: 2023-10-16 | End: 2023-10-16 | Stop reason: HOSPADM

## 2023-10-16 RX ORDER — MIDAZOLAM HYDROCHLORIDE 1 MG/ML
1 INJECTION INTRAMUSCULAR; INTRAVENOUS
Status: DISCONTINUED | OUTPATIENT
Start: 2023-10-16 | End: 2023-10-16 | Stop reason: HOSPADM

## 2023-10-16 RX ORDER — ONDANSETRON 2 MG/ML
INJECTION INTRAMUSCULAR; INTRAVENOUS AS NEEDED
Status: DISCONTINUED | OUTPATIENT
Start: 2023-10-16 | End: 2023-10-16 | Stop reason: SURG

## 2023-10-16 RX ADMIN — ONDANSETRON 4 MG: 2 INJECTION INTRAMUSCULAR; INTRAVENOUS at 14:59

## 2023-10-16 RX ADMIN — CEFAZOLIN 3000 MG: 10 INJECTION, POWDER, FOR SOLUTION INTRAVENOUS at 14:05

## 2023-10-16 RX ADMIN — PROPOFOL 250 MG: 10 INJECTION, EMULSION INTRAVENOUS at 14:17

## 2023-10-16 RX ADMIN — SODIUM CHLORIDE, POTASSIUM CHLORIDE, SODIUM LACTATE AND CALCIUM CHLORIDE 9 ML/HR: 600; 310; 30; 20 INJECTION, SOLUTION INTRAVENOUS at 13:32

## 2023-10-16 RX ADMIN — LIDOCAINE HYDROCHLORIDE 80 MG: 20 INJECTION, SOLUTION INFILTRATION; PERINEURAL at 14:17

## 2023-10-16 RX ADMIN — DEXAMETHASONE SODIUM PHOSPHATE 8 MG: 4 INJECTION, SOLUTION INTRAMUSCULAR; INTRAVENOUS at 14:25

## 2023-10-16 RX ADMIN — FAMOTIDINE 20 MG: 10 INJECTION INTRAVENOUS at 13:32

## 2023-10-16 RX ADMIN — GLYCOPYRROLATE 0.2 MG: 0.2 INJECTION INTRAMUSCULAR; INTRAVENOUS at 14:17

## 2023-10-16 NOTE — ANESTHESIA POSTPROCEDURE EVALUATION
Patient: Dale Marr    Procedure Summary       Date: 10/16/23 Room / Location: [facility] OR  / [facility] MAIN OR    Anesthesia Start: 1412 Anesthesia Stop: 1512    Procedure: RIGHT ESWL (Right) Diagnosis:     Surgeons: Immanuel Bryan MD Provider: Emery Campbell MD    Anesthesia Type: general ASA Status: 3            Anesthesia Type: general    Vitals  Vitals Value Taken Time   /84 10/16/23 1515   Temp 36.4 °C (97.6 °F) 10/16/23 1510   Pulse 88 10/16/23 1518   Resp 16 10/16/23 1515   SpO2 100 % 10/16/23 1518   Vitals shown include unfiled device data.        Post Anesthesia Care and Evaluation    Pain management: adequate    Airway patency: patent  Anesthetic complications: No anesthetic complications    Cardiovascular status: acceptable  Respiratory status: acceptable  Hydration status: acceptable    Comments: /84   Pulse 84   Temp 36.4 °C (97.6 °F) (Oral)   Resp 16   SpO2 100%

## 2023-10-16 NOTE — OP NOTE
Operative Note    10/16/2023    Dale Marr  38 y.o.  1985  male  [id number]    Attending Surgeon:   Immanuel Bryan MD    Assistant(s): None    Pre-operative Diagnosis:   1. Right Nephrolithiasis      Post-operative Diagnosis: Same    Procedure:   1. Right extracorporeal shock wave lithotripsy (Staged procedure)    Indication:  Symptomatic right nephrolithiasis, imaging confirmed. Primary Urologist is Dr. Vogel.    We discussed the benefits/risks/expectations and the patient desires surgical intervention. Risks include bleeding, infection, urinary tract infection/sepsis, retained stone fragments, damage to adjacent tissues including the genitalia, chronic pain, numbness, incontinence, stroke, heart attack, death, and need for additional procedures.    Findings:   Fluoroscopy demonstrated a radio-opaque stone at the mid pole of the renal shadow, consistent with patient's known stone burden, just lateral to the L2 transverse process    Stones fragmented well. 3000 shocks were administered at a max energy level of 7.    Description of procedure:  After informed consent was obtained, the patient was taken to the OR and general anesthesia was induced. The patient was placed on the lithotripter table in a supine position and placed under general anesthesia. A timeout was performed and all team members were in agreement. Fluoroscopy demonstrated a radio-opaque stone at the mid pole of the renal shadow, consistent with patient's known stone burden.  The patient was prepped and draped.     Right ESWL was then performed. A total of 3000 shocks were administered to the stone at a max energy level 7.  The stone fragmented well. The patient tolerated the procedure well and was awakened from anesthesia in the OR. The patient was transferred to the recovery room in stable condition.    Complications: None    Specimens: None    Estimated Blood Loss: Minimal    Disposition:  To PACU then home    Patient to follow up  with Dr. Vogel in 2-3 weeks.    Immanuel Bryan MD  [facility]

## 2023-10-16 NOTE — H&P
"     [facility] History & Physical      Patient Identification:  NAME:  Dale Marr  Age:  38 y.o.   Sex:  male   :  1985   MRN:  [id number]       Chief complaint: Planned Procedure    History of present illness:  Dale Marr is a 38 y.o. patient with right nephrolithiasis. No changes since last seen in clinic.      Past medical history:  Past Medical History:   Diagnosis Date    Anxiety     Asthma     Bilateral leg edema     GERD (gastroesophageal reflux disease)     History of opioid abuse     AND ABUSE OF HEROIN PT STATES CLEAN FOR 1 YEAR    Kidney stones     Lung nodule     FOLLOWED BY [facility]    Prediabetes     Psoriasis        Past surgical history:  Past Surgical History:   Procedure Laterality Date    APPENDECTOMY         Allergies:  Phenazopyridine    Home medications:  No medications prior to admission.        Hospital medications:    No current facility-administered medications for this encounter.        Family history:  Family History   Problem Relation Age of Onset    Malig Hyperthermia Neg Hx        Social history:  Social History     Tobacco Use    Smoking status: Never   Vaping Use    Vaping Use: Never used   Substance Use Topics    Alcohol use: Yes     Comment: OCCASIONALLY    Drug use: Yes     Types: Marijuana, Heroin, Oxycodone     Comment: Heroin \"that I snort\" /LAST TIME 1 YEAR AGO. USES MARIJUANA DAILY       Objective:  TMax 24 hours:   No data recorded.      Vitals Ranges:        Intake/Output Last 3 shifts:  No intake/output data recorded.      Results review:   I reviewed the patient's new clinical results.    Data review:  Lab Results (last 24 hours)       ** No results found for the last 24 hours. **             Imaging:  Imaging Results (Last 24 Hours)       ** No results found for the last 24 hours. **               Assessment:       * No active hospital problems. *      Right nephrolithiasis    Plan:     Proceed with Right ESWL    The risks, benefits and alternatives of " the procedure were discussed in detail with the patient including risk of bleeding, infection, damage to surrounding structures, pain, erectile dysfunction, inability to clear the stone, inability to place stent requiring PCNT, need for more procedures, recurrence of the disease, need for dialysis, thromboembolism, MI, stroke, coma, and even death. The patient verbalized understanding and all questions were answered. They agreed to proceed with the procedure.        Immanuel Bryan MD  [facility]

## 2023-10-16 NOTE — ANESTHESIA PROCEDURE NOTES
Airway  Urgency: elective    Date/Time: 10/16/2023 2:19 PM    General Information and Staff    Patient location during procedure: OR  Anesthesiologist: Emery Campbell MD  CRNA/CAA: Angela Leslie CRNA    Indications and Patient Condition  Indications for airway management: airway protection    Preoxygenated: yes  MILS not maintained throughout  Mask difficulty assessment: 2 - vent by mask + OA or adjuvant +/- NMBA    Final Airway Details  Final airway type: supraglottic airway      Successful airway: unique  Size 5     Number of attempts at approach: 1  Assessment: lips, teeth, and gum same as pre-op and atraumatic intubation

## 2023-10-16 NOTE — ANESTHESIA PREPROCEDURE EVALUATION
Anesthesia Evaluation     no history of anesthetic complications:   NPO Solid Status: > 8 hours  NPO Liquid Status: > 2 hours           Airway   Mallampati: II  no difficulty expected  Comment: (+)full beard  Dental - normal exam     Pulmonary - normal exam   (+) a smoker (THC only--last yesterday), asthma,  (-) COPD, sleep apnea    ROS comment: Pulmonary nodule  PE comment: nonlabored  Cardiovascular - negative cardio ROS and normal exam  Exercise tolerance: good (4-7 METS)    Rhythm: regular  Rate: normal    (-) hypertension, valvular problems/murmurs, past MI, CAD, dysrhythmias, angina      Neuro/Psych  (+) psychiatric history Anxiety  (-) seizures, TIA, CVA  GI/Hepatic/Renal/Endo    (+) morbid obesity, GERD poorly controlled, renal disease stones  (-) liver disease, no thyroid disorderDiabetes: preDM.    Musculoskeletal (-) negative ROS    Abdominal    Substance History   (+) drug use (h/o opioid abuse but now sober; h/o THC, heroin, oxycodone)     OB/GYN          Other                    Anesthesia Plan    ASA 3     general     intravenous induction     Anesthetic plan, risks, benefits, and alternatives have been provided, discussed and informed consent has been obtained with: patient.    CODE STATUS:

## 2023-10-26 ENCOUNTER — OFFICE (OUTPATIENT)
Dept: URBAN - METROPOLITAN AREA CLINIC 76 | Facility: CLINIC | Age: 38
End: 2023-10-26
Payer: MEDICAID

## 2023-10-26 ENCOUNTER — TRANSCRIBE ORDERS (OUTPATIENT)
Dept: ADMINISTRATIVE | Facility: HOSPITAL | Age: 38
End: 2023-10-26
Payer: MEDICAID

## 2023-10-26 VITALS
DIASTOLIC BLOOD PRESSURE: 82 MMHG | SYSTOLIC BLOOD PRESSURE: 138 MMHG | HEART RATE: 70 BPM | WEIGHT: 303 LBS | OXYGEN SATURATION: 96 % | HEIGHT: 70 IN

## 2023-10-26 DIAGNOSIS — R11.2 NAUSEA WITH VOMITING, UNSPECIFIED: ICD-10-CM

## 2023-10-26 DIAGNOSIS — K21.9 GASTRO-ESOPHAGEAL REFLUX DISEASE WITHOUT ESOPHAGITIS: ICD-10-CM

## 2023-10-26 DIAGNOSIS — K59.00 CONSTIPATION, UNSPECIFIED: ICD-10-CM

## 2023-10-26 DIAGNOSIS — R11.0 NAUSEA: ICD-10-CM

## 2023-10-26 DIAGNOSIS — F12.90 CANNABINOID HYPEREMESIS SYNDROME: ICD-10-CM

## 2023-10-26 DIAGNOSIS — K59.00 CONSTIPATION, UNSPECIFIED CONSTIPATION TYPE: Primary | ICD-10-CM

## 2023-10-26 DIAGNOSIS — K59.00 CONSTIPATION, UNSPECIFIED CONSTIPATION TYPE: ICD-10-CM

## 2023-10-26 DIAGNOSIS — R13.10 DYSPHAGIA, UNSPECIFIED: ICD-10-CM

## 2023-10-26 DIAGNOSIS — R14.2 ERUCTATION: ICD-10-CM

## 2023-10-26 DIAGNOSIS — R11.10 REGURGITATION OF FOOD: ICD-10-CM

## 2023-10-26 DIAGNOSIS — R11.0 NAUSEA: Primary | ICD-10-CM

## 2023-10-26 DIAGNOSIS — R11.2 CANNABINOID HYPEREMESIS SYNDROME: ICD-10-CM

## 2023-10-26 PROCEDURE — 99214 OFFICE O/P EST MOD 30 MIN: CPT | Performed by: NURSE PRACTITIONER

## 2023-10-26 RX ORDER — LACTULOSE 10 G/15ML
SOLUTION ORAL; RECTAL
Qty: 2700 | Refills: 5 | Status: COMPLETED
Start: 2023-10-26 | End: 2023-10-26

## 2023-10-26 RX ORDER — ONDANSETRON 4 MG/1
12 TABLET, ORALLY DISINTEGRATING ORAL
Qty: 45 | Refills: 5 | Status: ACTIVE
Start: 2023-10-26

## 2024-02-01 ENCOUNTER — OFFICE (OUTPATIENT)
Dept: URBAN - METROPOLITAN AREA CLINIC 76 | Facility: CLINIC | Age: 39
End: 2024-02-01
Payer: MEDICAID

## 2024-02-01 VITALS
DIASTOLIC BLOOD PRESSURE: 64 MMHG | HEIGHT: 70 IN | OXYGEN SATURATION: 97 % | WEIGHT: 310 LBS | HEART RATE: 78 BPM | SYSTOLIC BLOOD PRESSURE: 102 MMHG

## 2024-02-01 DIAGNOSIS — R14.2 ERUCTATION: ICD-10-CM

## 2024-02-01 DIAGNOSIS — R11.2 NAUSEA WITH VOMITING, UNSPECIFIED: ICD-10-CM

## 2024-02-01 DIAGNOSIS — R13.10 DYSPHAGIA, UNSPECIFIED: ICD-10-CM

## 2024-02-01 DIAGNOSIS — K21.9 GASTRO-ESOPHAGEAL REFLUX DISEASE WITHOUT ESOPHAGITIS: ICD-10-CM

## 2024-02-01 PROCEDURE — 99214 OFFICE O/P EST MOD 30 MIN: CPT | Performed by: NURSE PRACTITIONER

## 2024-02-01 RX ORDER — PANTOPRAZOLE 40 MG/1
40 TABLET, DELAYED RELEASE ORAL
Qty: 90 | Refills: 3 | Status: ACTIVE

## 2024-05-27 ENCOUNTER — HOSPITAL ENCOUNTER (EMERGENCY)
Facility: HOSPITAL | Age: 39
Discharge: HOME | End: 2024-05-27
Attending: EMERGENCY MEDICINE | Admitting: EMERGENCY MEDICINE
Payer: MEDICAID

## 2024-05-27 VITALS
TEMPERATURE: 98.4 F | SYSTOLIC BLOOD PRESSURE: 104 MMHG | RESPIRATION RATE: 18 BRPM | BODY MASS INDEX: 40.6 KG/M2 | WEIGHT: 290 LBS | OXYGEN SATURATION: 97 % | HEART RATE: 63 BPM | HEIGHT: 71 IN | DIASTOLIC BLOOD PRESSURE: 51 MMHG

## 2024-05-27 DIAGNOSIS — F41.9 ANXIETY: ICD-10-CM

## 2024-05-27 DIAGNOSIS — R11.2 NAUSEA VOMITING AND DIARRHEA: Primary | ICD-10-CM

## 2024-05-27 DIAGNOSIS — I95.1 ORTHOSTASIS: ICD-10-CM

## 2024-05-27 DIAGNOSIS — R19.7 NAUSEA VOMITING AND DIARRHEA: Primary | ICD-10-CM

## 2024-05-27 DIAGNOSIS — R55 NEAR SYNCOPE: ICD-10-CM

## 2024-05-27 LAB
ALBUMIN SERPL-MCNC: 4.3 G/DL (ref 3.5–5.2)
ALBUMIN/GLOB SERPL: 1.3 G/DL
ALP SERPL-CCNC: 70 U/L (ref 39–117)
ALT SERPL W P-5'-P-CCNC: 19 U/L (ref 1–41)
ANION GAP SERPL CALCULATED.3IONS-SCNC: 14 MMOL/L (ref 5–15)
AST SERPL-CCNC: 12 U/L (ref 1–40)
BASOPHILS # BLD AUTO: 0.06 10*3/MM3 (ref 0–0.2)
BASOPHILS NFR BLD AUTO: 0.6 % (ref 0–1.5)
BILIRUB SERPL-MCNC: 0.4 MG/DL (ref 0–1.2)
BUN SERPL-MCNC: 9 MG/DL (ref 6–20)
BUN/CREAT SERPL: 9.2 (ref 7–25)
CALCIUM SPEC-SCNC: 9.3 MG/DL (ref 8.6–10.5)
CHLORIDE SERPL-SCNC: 101 MMOL/L (ref 98–107)
CO2 SERPL-SCNC: 25 MMOL/L (ref 22–29)
CREAT SERPL-MCNC: 0.98 MG/DL (ref 0.76–1.27)
DEPRECATED RDW RBC AUTO: 39.9 FL (ref 37–54)
EGFRCR SERPLBLD CKD-EPI 2021: 100 ML/MIN/1.73
EOSINOPHIL # BLD AUTO: 0.24 10*3/MM3 (ref 0–0.4)
EOSINOPHIL NFR BLD AUTO: 2.5 % (ref 0.3–6.2)
ERYTHROCYTE [DISTWIDTH] IN BLOOD BY AUTOMATED COUNT: 13.5 % (ref 12.3–15.4)
GEN 5 2HR TROPONIN T REFLEX: 18 NG/L
GLOBULIN UR ELPH-MCNC: 3.2 GM/DL
GLUCOSE SERPL-MCNC: 131 MG/DL (ref 65–99)
HCT VFR BLD AUTO: 44.9 % (ref 37.5–51)
HGB BLD-MCNC: 14.6 G/DL (ref 13–17.7)
IMM GRANULOCYTES # BLD AUTO: 0.03 10*3/MM3 (ref 0–0.05)
IMM GRANULOCYTES NFR BLD AUTO: 0.3 % (ref 0–0.5)
LIPASE SERPL-CCNC: 18 U/L (ref 13–60)
LYMPHOCYTES # BLD AUTO: 1.36 10*3/MM3 (ref 0.7–3.1)
LYMPHOCYTES NFR BLD AUTO: 14.3 % (ref 19.6–45.3)
MCH RBC QN AUTO: 26.8 PG (ref 26.6–33)
MCHC RBC AUTO-ENTMCNC: 32.5 G/DL (ref 31.5–35.7)
MCV RBC AUTO: 82.5 FL (ref 79–97)
MONOCYTES # BLD AUTO: 0.46 10*3/MM3 (ref 0.1–0.9)
MONOCYTES NFR BLD AUTO: 4.8 % (ref 5–12)
NEUTROPHILS NFR BLD AUTO: 7.38 10*3/MM3 (ref 1.7–7)
NEUTROPHILS NFR BLD AUTO: 77.5 % (ref 42.7–76)
NRBC BLD AUTO-RTO: 0 /100 WBC (ref 0–0.2)
PLATELET # BLD AUTO: 245 10*3/MM3 (ref 140–450)
PMV BLD AUTO: 10.7 FL (ref 6–12)
POTASSIUM SERPL-SCNC: 3.9 MMOL/L (ref 3.5–5.2)
PROT SERPL-MCNC: 7.5 G/DL (ref 6–8.5)
RBC # BLD AUTO: 5.44 10*6/MM3 (ref 4.14–5.8)
SODIUM SERPL-SCNC: 140 MMOL/L (ref 136–145)
TROPONIN T DELTA: 4 NG/L
TROPONIN T SERPL HS-MCNC: 14 NG/L
WBC NRBC COR # BLD AUTO: 9.53 10*3/MM3 (ref 3.4–10.8)

## 2024-05-27 PROCEDURE — 84484 ASSAY OF TROPONIN QUANT: CPT | Performed by: EMERGENCY MEDICINE

## 2024-05-27 PROCEDURE — 36415 COLL VENOUS BLD VENIPUNCTURE: CPT

## 2024-05-27 PROCEDURE — 83690 ASSAY OF LIPASE: CPT | Performed by: EMERGENCY MEDICINE

## 2024-05-27 PROCEDURE — 25810000003 SODIUM CHLORIDE 0.9 % SOLUTION: Performed by: EMERGENCY MEDICINE

## 2024-05-27 PROCEDURE — 93005 ELECTROCARDIOGRAM TRACING: CPT | Performed by: EMERGENCY MEDICINE

## 2024-05-27 PROCEDURE — 85025 COMPLETE CBC W/AUTO DIFF WBC: CPT | Performed by: EMERGENCY MEDICINE

## 2024-05-27 PROCEDURE — 99283 EMERGENCY DEPT VISIT LOW MDM: CPT

## 2024-05-27 PROCEDURE — 80053 COMPREHEN METABOLIC PANEL: CPT | Performed by: EMERGENCY MEDICINE

## 2024-05-27 RX ORDER — ONDANSETRON 4 MG/1
4 TABLET, ORALLY DISINTEGRATING ORAL 4 TIMES DAILY PRN
Qty: 12 TABLET | Refills: 0 | Status: SHIPPED | OUTPATIENT
Start: 2024-05-27

## 2024-05-27 RX ADMIN — SODIUM CHLORIDE 1000 ML: 9 INJECTION, SOLUTION INTRAVENOUS at 20:22

## 2024-05-28 LAB
QT INTERVAL: 390 MS
QTC INTERVAL: 436 MS

## 2024-05-28 NOTE — DISCHARGE INSTRUCTIONS
Follow-up with your primary care doctor for further evaluation.  Drink plenty of fluids to stay hydrated.  Use the nausea medicine to help with any nausea.   The cardiologist office will call you to schedule for an appointment to be seen regarding your episodes of nearly passing out.  Return immediately for any chest pain, shortness of breath or passing out.  Return immediately if you are having further episodes of nearly passing out.

## 2024-05-28 NOTE — ED PROVIDER NOTES
" EMERGENCY DEPARTMENT ENCOUNTER  Room Number:  21/21  PCP: Jamilah Hernandez APRN  Independent Historians: Patient      HPI:  Chief Complaint: had concerns including Vomiting (Pt states he has been having GERD problems for a few days now. States he was seen at [facility] yesterday. States he has been having issues with bowel movements and has been feeling like he is \"full.\").     A complete HPI/ROS/PMH/PSH/SH/FH are unobtainable due to: None    Chronic or social conditions impacting patient care (Social Determinants of Health): None      Context: Dale Marr is a 39 y.o. male with a medical history of asthma, anxiety, GERD, prediabetes who presents to the ED c/o acute near syncopal episode and abdominal pain.  The patient reports on Saturday he ate cheesecake, cereal, and a burger.  He states that he had nausea and vomiting and diarrhea afterwards.  He reports that he was in the shower yesterday and had a near syncopal episode.  He reports his hands were tingly which she states he typically gets when he gets anxious.  He states his heart rate was going fast.  He reports he went to the ER last night and left before they did full workup.  He states he had another near syncopal episode today.  He denies any chest pain or shortness of breath.  He does report he feels like he has acid reflux.  Nothing makes it worse or better.      Review of prior external notes (non-ED) -and- Review of prior external test results outside of this encounter:  CT of the abdomen pelvis with contrast dated today was normal.  Chest x-ray dated yesterday was normal.  CBC showed a leukocytosis of 17,000 yesterday.    Prescription drug monitoring program review:         PAST MEDICAL HISTORY  Active Ambulatory Problems     Diagnosis Date Noted    No Active Ambulatory Problems     Resolved Ambulatory Problems     Diagnosis Date Noted    No Resolved Ambulatory Problems     Past Medical History:   Diagnosis Date    Anxiety     Asthma     " "Bilateral leg edema     GERD (gastroesophageal reflux disease)     History of opioid abuse     Kidney stones     Lung nodule     Prediabetes     Psoriasis          PAST SURGICAL HISTORY  Past Surgical History:   Procedure Laterality Date    APPENDECTOMY      EXTRACORPOREAL SHOCK WAVE LITHOTRIPSY (ESWL) Right 10/16/2023    Procedure: RIGHT ESWL;  Surgeon: Immanuel Bryan MD;  Location: [facility];  Service: Urology;  Laterality: Right;         FAMILY HISTORY  Family History   Problem Relation Age of Onset    Malig Hyperthermia Neg Hx          SOCIAL HISTORY  Social History     Socioeconomic History    Marital status: Single   Tobacco Use    Smoking status: Never   Vaping Use    Vaping status: Never Used   Substance and Sexual Activity    Alcohol use: Yes     Comment: OCCASIONALLY    Drug use: Yes     Types: Marijuana, Heroin, Oxycodone     Comment: Heroin \"that I snort\" /LAST TIME 1 YEAR AGO. USES MARIJUANA DAILY    Sexual activity: Defer         ALLERGIES  Phenazopyridine      REVIEW OF SYSTEMS  Review of Systems  Included in HPI  All systems reviewed and negative except for those discussed in HPI.      PHYSICAL EXAM    I have reviewed the triage vital signs and nursing notes.    ED Triage Vitals   Temp Heart Rate Resp BP SpO2   05/27/24 1955 05/27/24 1955 05/27/24 1955 05/27/24 2006 05/27/24 1955   98.4 °F (36.9 °C) 105 18 132/91 94 %      Temp src Heart Rate Source Patient Position BP Location FiO2 (%)   05/27/24 1955 05/27/24 1955 -- -- --   Tympanic Monitor          Physical Exam  GENERAL: Awake, alert, no acute distress, not acutely ill-appearing  SKIN: Warm, dry  HENT: Normocephalic, atraumatic  EYES: no scleral icterus  CV: regular rhythm, regular rate  RESPIRATORY: normal effort, lungs clear  ABDOMEN: soft, nontender, nondistended  MUSCULOSKELETAL: no deformity, no calf tenderness or swelling  NEURO: alert, moves all extremities, follows commands            LAB RESULTS  Recent Results (from the past 24 " hour(s))   Comprehensive Metabolic Panel    Collection Time: 05/27/24  8:12 PM    Specimen: Blood   Result Value Ref Range    Glucose 131 (H) 65 - 99 mg/dL    BUN 9 6 - 20 mg/dL    Creatinine 0.98 0.76 - 1.27 mg/dL    Sodium 140 136 - 145 mmol/L    Potassium 3.9 3.5 - 5.2 mmol/L    Chloride 101 98 - 107 mmol/L    CO2 25.0 22.0 - 29.0 mmol/L    Calcium 9.3 8.6 - 10.5 mg/dL    Total Protein 7.5 6.0 - 8.5 g/dL    Albumin 4.3 3.5 - 5.2 g/dL    ALT (SGPT) 19 1 - 41 U/L    AST (SGOT) 12 1 - 40 U/L    Alkaline Phosphatase 70 39 - 117 U/L    Total Bilirubin 0.4 0.0 - 1.2 mg/dL    Globulin 3.2 gm/dL    A/G Ratio 1.3 g/dL    BUN/Creatinine Ratio 9.2 7.0 - 25.0    Anion Gap 14.0 5.0 - 15.0 mmol/L    eGFR 100.0 >60.0 mL/min/1.73   Lipase    Collection Time: 05/27/24  8:12 PM    Specimen: Blood   Result Value Ref Range    Lipase 18 13 - 60 U/L   CBC Auto Differential    Collection Time: 05/27/24  8:12 PM    Specimen: Blood   Result Value Ref Range    WBC 9.53 3.40 - 10.80 10*3/mm3    RBC 5.44 4.14 - 5.80 10*6/mm3    Hemoglobin 14.6 13.0 - 17.7 g/dL    Hematocrit 44.9 37.5 - 51.0 %    MCV 82.5 79.0 - 97.0 fL    MCH 26.8 26.6 - 33.0 pg    MCHC 32.5 31.5 - 35.7 g/dL    RDW 13.5 12.3 - 15.4 %    RDW-SD 39.9 37.0 - 54.0 fl    MPV 10.7 6.0 - 12.0 fL    Platelets 245 140 - 450 10*3/mm3    Neutrophil % 77.5 (H) 42.7 - 76.0 %    Lymphocyte % 14.3 (L) 19.6 - 45.3 %    Monocyte % 4.8 (L) 5.0 - 12.0 %    Eosinophil % 2.5 0.3 - 6.2 %    Basophil % 0.6 0.0 - 1.5 %    Immature Grans % 0.3 0.0 - 0.5 %    Neutrophils, Absolute 7.38 (H) 1.70 - 7.00 10*3/mm3    Lymphocytes, Absolute 1.36 0.70 - 3.10 10*3/mm3    Monocytes, Absolute 0.46 0.10 - 0.90 10*3/mm3    Eosinophils, Absolute 0.24 0.00 - 0.40 10*3/mm3    Basophils, Absolute 0.06 0.00 - 0.20 10*3/mm3    Immature Grans, Absolute 0.03 0.00 - 0.05 10*3/mm3    nRBC 0.0 0.0 - 0.2 /100 WBC   High Sensitivity Troponin T    Collection Time: 05/27/24  8:12 PM    Specimen: Blood   Result Value Ref Range     HS Troponin T 14 <22 ng/L   ECG 12 Lead Syncope    Collection Time: 05/27/24  8:28 PM   Result Value Ref Range    QT Interval 390 ms    QTC Interval 436 ms   High Sensitivity Troponin T 2Hr    Collection Time: 05/27/24 10:04 PM    Specimen: Blood   Result Value Ref Range    HS Troponin T 18 <22 ng/L    Troponin T Delta 4 (C) >=-4 - <+4 ng/L         RADIOLOGY  CT Abdomen Pelvis With Contrast    Result Date: 5/27/2024  INDICATION:  Left chest pain.  Right lower quadrant abdominal pain, nausea and vomiting.  Dizziness.  Additional History:  Cirrhosis.  Appendectomy.  GERD. TECHNIQUE:  CT of the abdomen and pelvis was performed with intravenous contrast. Isovue 370 70 mL was administered intravenously.   Automated mA/kV exposure control was utilized and patient examination was performed in strict accordance with principles of ALARA. RADIATION AMOUNT:  2146.89 mGy-cm. COMPARISON:  US right upper quadrant 1/1/2022. FINDINGS: Abdomen: The lung bases demonstrate a 9 mm noncalcified nodule at the left lower lobe lung. The liver is normal.  No calcified stones in the gallbladder lumen.  The pancreas, spleen, adrenal glands demonstrate no acute pathology. There are 3 calculi nonobstructing in calyces of the right kidney, the largest measuring 5 mm. There is no free air or lymph node enlargement.  Abdominal aorta is not aneurysmal. Pelvis: There is no bowel wall thickening or obstruction.  The appendix is absent.  There is no free fluid.  Lymph nodes are not enlarged.  Urinary bladder is unremarkable. Skeleton:   There are no acute fractures.  No suspicious bony lesions. IMPRESSION: 9 mm left lower lobe lung nodule, consider follow-up imaging according to the guidelines below. Nonobstructing calculi in calyces of the right kidney. Fleischner Society 2017 Guidelines for Management of Incidentally Detected Pulmonary Nodules in Adults: A.  SOLID NODULES# Nodule Type and Size: Single: #Low Risk## < 6 mm - No routine follow-up  6-8 mm - CT at 6-12 months, then consider CT at 18-24 months > 8 mm - Consider CT at 3 months, PET/CT, or tissue sampling #High Risk## < 6 mm - Optional CT at 12 months 6-8 mm - CT at 6-12 months, then CT at 18-24 months > 8 mm - Consider CT at 3 months, PET/CT, or tissue sampling Note - These recommendations do not apply to lung cancer screening, patients with immunosuppression, or patients with known primary cancer. # Dimensions are average of long and short axes, rounded to the nearest millimeter. ## Consider all relevant risk factors. Signed by Real Hector MD ##### Final ##### Dictated by:    REAL HECTOR MD-RAD Dictated DT/TM: 05/27/2024 0:53 am Interpreted and electronically signed by:  REAL HECTOR MD-RAD Signed DT/TM:  05/27/2024 1:59 am       MEDICATIONS GIVEN IN ER  Medications   sodium chloride 0.9 % bolus 1,000 mL (0 mL Intravenous Stopped 5/27/24 6851)         ORDERS PLACED DURING THIS VISIT:  Orders Placed This Encounter   Procedures    Comprehensive Metabolic Panel    Lipase    CBC Auto Differential    High Sensitivity Troponin T    High Sensitivity Troponin T 2Hr    Ambulatory Referral to Cardiology    Orthostatic Vitals    ECG 12 Lead Syncope    CBC & Differential         OUTPATIENT MEDICATION MANAGEMENT:  No current Epic-ordered facility-administered medications on file.     Current Outpatient Medications Ordered in Epic   Medication Sig Dispense Refill    ALBUTEROL IN Inhale 2 puffs 4 (Four) Times a Day As Needed.      busPIRone (BUSPAR) 10 MG tablet Take 1 tablet by mouth 3 (Three) Times a Day As Needed.      hydroCHLOROthiazide (MICROZIDE) 12.5 MG capsule Take 1 capsule by mouth Every Morning.      HYDROcodone-acetaminophen (Norco) 5-325 MG per tablet Take 1 tablet by mouth Every 6 (Six) Hours As Needed for Severe Pain. 8 tablet 0    hydrOXYzine pamoate (VISTARIL) 25 MG capsule Take 1 capsule by mouth 3 (Three) Times a Day As Needed.      ketoconazole (NIZORAL) 2 % cream Daily As Needed.       ondansetron ODT (ZOFRAN-ODT) 4 MG disintegrating tablet Place 1 tablet on the tongue 4 (Four) Times a Day As Needed for Nausea. 12 tablet 0    pantoprazole (PROTONIX) 40 MG EC tablet Take 1 tablet by mouth Daily.      tamsulosin (FLOMAX) 0.4 MG capsule 24 hr capsule Take 1 capsule by mouth Daily. 14 capsule 0         PROCEDURES  Procedures            PROGRESS, DATA ANALYSIS, CONSULTS, AND MEDICAL DECISION MAKING  All labs have been independently interpreted by me.  All radiology studies have been reviewed by me. All EKG's have been independently viewed and interpreted by me.  Discussion below represents my analysis of pertinent findings related to patient's condition, differential diagnosis, treatment plan and final disposition.    Differential diagnosis includes but is not limited to dehydration, acute coronary syndrome, acute aortic syndrome, PE, pneumothorax, unstable angina, arrhythmia.    Clinical Scores:                   ED Course as of 05/27/24 2253   Mon May 27, 2024   2036 EKG          EKG time: 2028  Rhythm/Rate: Normal sinus, rate 75  P waves and WA: Normal P, Normal WA  QRS, axis: Narrow QRS, Normal axis  ST and T waves: No acute changes    Independently Interpreted by me  Not significantly changed compared to prior 12/18/2016   [TR]   2243 Orthostatics are positive by heart rate. [TR]   2250 I reviewed the workup and findings with the patient at the bedside.  Answered all questions.  The patient has normal chemistries and blood counts.  He was orthostatic and has had recent nausea vomiting and diarrhea.  He does report a history of underlying anxiety.  I suspect that his near syncopal episode today was related to being in the hot shower, being volume depleted from his nausea vomiting and diarrhea.  He does have a delta troponin of 4 however both of his troponins are normal.  I do not suspect acute coronary syndrome or PE given his presentation.  He has normal vital signs otherwise.  I offered  admission to the observation unit for further syncopal workup but the patient declines.  I think this is reasonable.  Plan discharge home with outpatient follow-up. [TR]      ED Course User Index  [TR] Hector Beltre MD             AS OF 22:53 EDT VITALS:    BP - 104/51  HR - 61  TEMP - 98.4 °F (36.9 °C) (Tympanic)  O2 SATS - 95%    COMPLEXITY OF CARE  Admission was considered but after careful review of the patient's presentation, physical examination, diagnostic results, and response to treatment the patient may be safely discharged with outpatient follow-up.      DIAGNOSIS  Final diagnoses:   Nausea vomiting and diarrhea   Anxiety   Orthostasis   Near syncope         DISPOSITION  ED Disposition       ED Disposition   Discharge    Condition   Stable    Comment   --                Please note that portions of this document were completed with a voice recognition program.    Note Disclaimer: At [facility], we believe that sharing information builds trust and better relationships. You are receiving this note because you recently visited [facility]. It is possible you will see health information before a provider has talked with you about it. This kind of information can be easy to misunderstand. To help you fully understand what it means for your health, we urge you to discuss this note with your provider.         Hector Beltre MD  05/27/24 9087

## 2024-05-31 ENCOUNTER — APPOINTMENT (OUTPATIENT)
Dept: GENERAL RADIOLOGY | Facility: HOSPITAL | Age: 39
End: 2024-05-31
Payer: MEDICAID

## 2024-05-31 ENCOUNTER — HOSPITAL ENCOUNTER (EMERGENCY)
Facility: HOSPITAL | Age: 39
Discharge: HOME | End: 2024-05-31
Attending: EMERGENCY MEDICINE
Payer: MEDICAID

## 2024-05-31 VITALS
DIASTOLIC BLOOD PRESSURE: 90 MMHG | TEMPERATURE: 96.4 F | HEART RATE: 70 BPM | RESPIRATION RATE: 16 BRPM | WEIGHT: 291.01 LBS | OXYGEN SATURATION: 96 % | HEIGHT: 71 IN | SYSTOLIC BLOOD PRESSURE: 131 MMHG | BODY MASS INDEX: 40.74 KG/M2

## 2024-05-31 DIAGNOSIS — R07.89 ATYPICAL CHEST PAIN: Primary | ICD-10-CM

## 2024-05-31 DIAGNOSIS — R11.2 NAUSEA AND VOMITING, UNSPECIFIED VOMITING TYPE: ICD-10-CM

## 2024-05-31 DIAGNOSIS — Z87.19 HISTORY OF GASTROESOPHAGEAL REFLUX (GERD): ICD-10-CM

## 2024-05-31 DIAGNOSIS — F41.9 ANXIETY: ICD-10-CM

## 2024-05-31 LAB
ALBUMIN SERPL-MCNC: 4.4 G/DL (ref 3.5–5.2)
ALBUMIN/GLOB SERPL: 1.4 G/DL
ALP SERPL-CCNC: 68 U/L (ref 39–117)
ALT SERPL W P-5'-P-CCNC: 25 U/L (ref 1–41)
ANION GAP SERPL CALCULATED.3IONS-SCNC: 8 MMOL/L (ref 5–15)
AST SERPL-CCNC: 13 U/L (ref 1–40)
BASOPHILS # BLD AUTO: 0.07 10*3/MM3 (ref 0–0.2)
BASOPHILS NFR BLD AUTO: 0.7 % (ref 0–1.5)
BILIRUB SERPL-MCNC: 0.5 MG/DL (ref 0–1.2)
BUN SERPL-MCNC: 8 MG/DL (ref 6–20)
BUN/CREAT SERPL: 6.9 (ref 7–25)
CALCIUM SPEC-SCNC: 9.1 MG/DL (ref 8.6–10.5)
CHLORIDE SERPL-SCNC: 101 MMOL/L (ref 98–107)
CO2 SERPL-SCNC: 29 MMOL/L (ref 22–29)
CREAT SERPL-MCNC: 1.16 MG/DL (ref 0.76–1.27)
DEPRECATED RDW RBC AUTO: 37.4 FL (ref 37–54)
EGFRCR SERPLBLD CKD-EPI 2021: 81.7 ML/MIN/1.73
EOSINOPHIL # BLD AUTO: 0.43 10*3/MM3 (ref 0–0.4)
EOSINOPHIL NFR BLD AUTO: 4.2 % (ref 0.3–6.2)
ERYTHROCYTE [DISTWIDTH] IN BLOOD BY AUTOMATED COUNT: 13 % (ref 12.3–15.4)
GEN 5 2HR TROPONIN T REFLEX: 9 NG/L
GLOBULIN UR ELPH-MCNC: 3.1 GM/DL
GLUCOSE SERPL-MCNC: 134 MG/DL (ref 65–99)
HCT VFR BLD AUTO: 44 % (ref 37.5–51)
HGB BLD-MCNC: 14.6 G/DL (ref 13–17.7)
HOLD SPECIMEN: NORMAL
HOLD SPECIMEN: NORMAL
IMM GRANULOCYTES # BLD AUTO: 0.02 10*3/MM3 (ref 0–0.05)
IMM GRANULOCYTES NFR BLD AUTO: 0.2 % (ref 0–0.5)
LYMPHOCYTES # BLD AUTO: 1.7 10*3/MM3 (ref 0.7–3.1)
LYMPHOCYTES NFR BLD AUTO: 16.6 % (ref 19.6–45.3)
MCH RBC QN AUTO: 26.8 PG (ref 26.6–33)
MCHC RBC AUTO-ENTMCNC: 33.2 G/DL (ref 31.5–35.7)
MCV RBC AUTO: 80.7 FL (ref 79–97)
MONOCYTES # BLD AUTO: 0.61 10*3/MM3 (ref 0.1–0.9)
MONOCYTES NFR BLD AUTO: 5.9 % (ref 5–12)
NEUTROPHILS NFR BLD AUTO: 7.44 10*3/MM3 (ref 1.7–7)
NEUTROPHILS NFR BLD AUTO: 72.4 % (ref 42.7–76)
NRBC BLD AUTO-RTO: 0 /100 WBC (ref 0–0.2)
PLATELET # BLD AUTO: 241 10*3/MM3 (ref 140–450)
PMV BLD AUTO: 10.7 FL (ref 6–12)
POTASSIUM SERPL-SCNC: 4 MMOL/L (ref 3.5–5.2)
PROCALCITONIN SERPL-MCNC: 0.04 NG/ML (ref 0–0.25)
PROT SERPL-MCNC: 7.5 G/DL (ref 6–8.5)
RBC # BLD AUTO: 5.45 10*6/MM3 (ref 4.14–5.8)
SODIUM SERPL-SCNC: 138 MMOL/L (ref 136–145)
TROPONIN T DELTA: 0 NG/L
TROPONIN T SERPL HS-MCNC: 9 NG/L
WBC NRBC COR # BLD AUTO: 10.27 10*3/MM3 (ref 3.4–10.8)
WHOLE BLOOD HOLD COAG: NORMAL
WHOLE BLOOD HOLD SPECIMEN: NORMAL

## 2024-05-31 PROCEDURE — 99284 EMERGENCY DEPT VISIT MOD MDM: CPT

## 2024-05-31 PROCEDURE — 93010 ELECTROCARDIOGRAM REPORT: CPT | Performed by: INTERNAL MEDICINE

## 2024-05-31 PROCEDURE — 93005 ELECTROCARDIOGRAM TRACING: CPT

## 2024-05-31 PROCEDURE — 80053 COMPREHEN METABOLIC PANEL: CPT

## 2024-05-31 PROCEDURE — 36415 COLL VENOUS BLD VENIPUNCTURE: CPT

## 2024-05-31 PROCEDURE — 25010000002 METOCLOPRAMIDE PER 10 MG: Performed by: PHYSICIAN ASSISTANT

## 2024-05-31 PROCEDURE — 84484 ASSAY OF TROPONIN QUANT: CPT

## 2024-05-31 PROCEDURE — 25810000003 SODIUM CHLORIDE 0.9 % SOLUTION: Performed by: PHYSICIAN ASSISTANT

## 2024-05-31 PROCEDURE — 96375 TX/PRO/DX INJ NEW DRUG ADDON: CPT

## 2024-05-31 PROCEDURE — 96374 THER/PROPH/DIAG INJ IV PUSH: CPT

## 2024-05-31 PROCEDURE — 71045 X-RAY EXAM CHEST 1 VIEW: CPT

## 2024-05-31 PROCEDURE — 85025 COMPLETE CBC W/AUTO DIFF WBC: CPT

## 2024-05-31 PROCEDURE — 84145 PROCALCITONIN (PCT): CPT | Performed by: PHYSICIAN ASSISTANT

## 2024-05-31 RX ORDER — METOCLOPRAMIDE HYDROCHLORIDE 5 MG/ML
10 INJECTION INTRAMUSCULAR; INTRAVENOUS ONCE
Status: COMPLETED | OUTPATIENT
Start: 2024-05-31 | End: 2024-05-31

## 2024-05-31 RX ORDER — FAMOTIDINE 10 MG/ML
20 INJECTION, SOLUTION INTRAVENOUS ONCE
Status: COMPLETED | OUTPATIENT
Start: 2024-05-31 | End: 2024-05-31

## 2024-05-31 RX ORDER — ASPIRIN 325 MG
325 TABLET ORAL ONCE
Status: DISCONTINUED | OUTPATIENT
Start: 2024-05-31 | End: 2024-05-31 | Stop reason: HOSPADM

## 2024-05-31 RX ORDER — SODIUM CHLORIDE 0.9 % (FLUSH) 0.9 %
10 SYRINGE (ML) INJECTION AS NEEDED
Status: DISCONTINUED | OUTPATIENT
Start: 2024-05-31 | End: 2024-05-31 | Stop reason: HOSPADM

## 2024-05-31 RX ADMIN — FAMOTIDINE 20 MG: 10 INJECTION INTRAVENOUS at 20:31

## 2024-05-31 RX ADMIN — METOCLOPRAMIDE 10 MG: 5 INJECTION, SOLUTION INTRAMUSCULAR; INTRAVENOUS at 20:31

## 2024-05-31 RX ADMIN — SODIUM CHLORIDE 1000 ML: 9 INJECTION, SOLUTION INTRAVENOUS at 17:56

## 2024-05-31 NOTE — ED PROVIDER NOTES
EMERGENCY DEPARTMENT ENCOUNTER      PCP: Jamilah Hernandez APRN  Patient Care Team:  Jamilah Hernandez APRN as PCP - General (Nurse Practitioner)   Independent Historians: Patient    HPI:  Chief Complaint: Chest pain, fatigue, numbness, nausea  A complete HPI/ROS/PMH/PSH/SH/FH are unobtainable due to: None    Chronic or social conditions impacting patient care (social determinants of health): None    Context: Dale Marr is a 39 y.o. male who presents to the ED c/o acute chest tightness, bilateral upper and lower extremity numbness and tingling, nausea and reflux that began about 5 to 6 days ago.  Patient states initially he had gotten sick with nausea and vomiting after eating at a restaurant.  He states he then took a hot shower and felt like he was going to pass out.  He was seen in the ER for the symptoms on 5/27 and was discharged home.  He has not yet followed up with his primary care provider but states he has continued to feel poorly.  He has had continued nausea and decreased appetite.  He is having shakiness and chest tightness.  He has not had any fevers.  No blood in his stool.  No hematemesis.  He denies personal cardiac history.  Nothing seems to make symptoms better or worse.    Review of prior external notes and/or external test results outside of this encounter: Patient was seen at 2 separate ERs on 5/27 for similar symptoms.  CT abdomen pelvis on 5/27 showed a 9 mm left lower lobe lung nodule, nonobstructing calculi in the right kidney, no bowel wall thickening or obstruction.  Chest x-ray was unremarkable.      PAST MEDICAL HISTORY  Active Ambulatory Problems     Diagnosis Date Noted    No Active Ambulatory Problems     Resolved Ambulatory Problems     Diagnosis Date Noted    No Resolved Ambulatory Problems     Past Medical History:   Diagnosis Date    Anxiety     Asthma     Bilateral leg edema     GERD (gastroesophageal reflux disease)     History of opioid abuse     Kidney stones   "   Lung nodule     Prediabetes     Psoriasis        The patient qualifies to receive the vaccine, but they have not yet received it.    PAST SURGICAL HISTORY  Past Surgical History:   Procedure Laterality Date    APPENDECTOMY      EXTRACORPOREAL SHOCK WAVE LITHOTRIPSY (ESWL) Right 10/16/2023    Procedure: RIGHT ESWL;  Surgeon: Immanuel Bryan MD;  Location: [facility];  Service: Urology;  Laterality: Right;         FAMILY HISTORY  Family History   Problem Relation Age of Onset    Malig Hyperthermia Neg Hx          SOCIAL HISTORY  Social History     Socioeconomic History    Marital status: Single   Tobacco Use    Smoking status: Never   Vaping Use    Vaping status: Never Used   Substance and Sexual Activity    Alcohol use: Yes     Comment: OCCASIONALLY    Drug use: Yes     Types: Marijuana, Heroin, Oxycodone     Comment: Heroin \"that I snort\" /LAST TIME 1 YEAR AGO. USES MARIJUANA DAILY    Sexual activity: Defer         ALLERGIES  Phenazopyridine        REVIEW OF SYSTEMS  Review of Systems   Constitutional:  Positive for appetite change.   Respiratory:  Positive for chest tightness.    Cardiovascular:  Positive for palpitations.   Gastrointestinal:  Positive for nausea and vomiting.   Genitourinary:  Negative for dysuria and hematuria.        All systems reviewed and negative except for those discussed in HPI.       PHYSICAL EXAM    I have reviewed the triage vital signs and nursing notes.    ED Triage Vitals   Temp Heart Rate Resp BP SpO2   05/31/24 1611 05/31/24 1611 05/31/24 1611 05/31/24 1620 05/31/24 1611   96.4 °F (35.8 °C) 116 18 122/80 93 %      Temp src Heart Rate Source Patient Position BP Location FiO2 (%)   -- 05/31/24 1611 05/31/24 1620 05/31/24 1620 --    Monitor Lying Right arm        Physical Exam  GENERAL: alert, no acute distress  SKIN: Warm, dry  HENT: Normocephalic, atraumatic, moist mucous membranes  EYES: no scleral icterus  CV: regular rhythm, regular rate  RESPIRATORY: normal effort, lungs " clear  ABDOMEN: soft, nontender, nondistended  MUSCULOSKELETAL: no deformity, no edema  NEURO: alert, moves all extremities, follows commands          LAB RESULTS  Recent Results (from the past 24 hour(s))   ECG 12 Lead Chest Pain    Collection Time: 05/31/24  4:15 PM   Result Value Ref Range    QT Interval 359 ms    QTC Interval 440 ms   Comprehensive Metabolic Panel    Collection Time: 05/31/24  4:20 PM    Specimen: Blood   Result Value Ref Range    Glucose 134 (H) 65 - 99 mg/dL    BUN 8 6 - 20 mg/dL    Creatinine 1.16 0.76 - 1.27 mg/dL    Sodium 138 136 - 145 mmol/L    Potassium 4.0 3.5 - 5.2 mmol/L    Chloride 101 98 - 107 mmol/L    CO2 29.0 22.0 - 29.0 mmol/L    Calcium 9.1 8.6 - 10.5 mg/dL    Total Protein 7.5 6.0 - 8.5 g/dL    Albumin 4.4 3.5 - 5.2 g/dL    ALT (SGPT) 25 1 - 41 U/L    AST (SGOT) 13 1 - 40 U/L    Alkaline Phosphatase 68 39 - 117 U/L    Total Bilirubin 0.5 0.0 - 1.2 mg/dL    Globulin 3.1 gm/dL    A/G Ratio 1.4 g/dL    BUN/Creatinine Ratio 6.9 (L) 7.0 - 25.0    Anion Gap 8.0 5.0 - 15.0 mmol/L    eGFR 81.7 >60.0 mL/min/1.73   High Sensitivity Troponin T    Collection Time: 05/31/24  4:20 PM    Specimen: Blood   Result Value Ref Range    HS Troponin T 9 <22 ng/L   Green Top (Gel)    Collection Time: 05/31/24  4:20 PM   Result Value Ref Range    Extra Tube Hold for add-ons.    Lavender Top    Collection Time: 05/31/24  4:20 PM   Result Value Ref Range    Extra Tube hold for add-on    Gold Top - SST    Collection Time: 05/31/24  4:20 PM   Result Value Ref Range    Extra Tube Hold for add-ons.    Light Blue Top    Collection Time: 05/31/24  4:20 PM   Result Value Ref Range    Extra Tube Hold for add-ons.    CBC Auto Differential    Collection Time: 05/31/24  4:20 PM    Specimen: Blood   Result Value Ref Range    WBC 10.27 3.40 - 10.80 10*3/mm3    RBC 5.45 4.14 - 5.80 10*6/mm3    Hemoglobin 14.6 13.0 - 17.7 g/dL    Hematocrit 44.0 37.5 - 51.0 %    MCV 80.7 79.0 - 97.0 fL    MCH 26.8 26.6 - 33.0 pg     MCHC 33.2 31.5 - 35.7 g/dL    RDW 13.0 12.3 - 15.4 %    RDW-SD 37.4 37.0 - 54.0 fl    MPV 10.7 6.0 - 12.0 fL    Platelets 241 140 - 450 10*3/mm3    Neutrophil % 72.4 42.7 - 76.0 %    Lymphocyte % 16.6 (L) 19.6 - 45.3 %    Monocyte % 5.9 5.0 - 12.0 %    Eosinophil % 4.2 0.3 - 6.2 %    Basophil % 0.7 0.0 - 1.5 %    Immature Grans % 0.2 0.0 - 0.5 %    Neutrophils, Absolute 7.44 (H) 1.70 - 7.00 10*3/mm3    Lymphocytes, Absolute 1.70 0.70 - 3.10 10*3/mm3    Monocytes, Absolute 0.61 0.10 - 0.90 10*3/mm3    Eosinophils, Absolute 0.43 (H) 0.00 - 0.40 10*3/mm3    Basophils, Absolute 0.07 0.00 - 0.20 10*3/mm3    Immature Grans, Absolute 0.02 0.00 - 0.05 10*3/mm3    nRBC 0.0 0.0 - 0.2 /100 WBC   Procalcitonin    Collection Time: 05/31/24  4:20 PM    Specimen: Blood   Result Value Ref Range    Procalcitonin 0.04 0.00 - 0.25 ng/mL   High Sensitivity Troponin T 2Hr    Collection Time: 05/31/24  6:41 PM    Specimen: Blood   Result Value Ref Range    HS Troponin T 9 <22 ng/L    Troponin T Delta 0 >=-4 - <+4 ng/L       Ordered the above labs and independently reviewed and interpreted the results.        RADIOLOGY  XR Chest 1 View    Result Date: 5/31/2024  XR CHEST 1 VW-  HISTORY: Male who is 39 years-old, chest pain  TECHNIQUE: Frontal view of the chest  COMPARISON: 12/18/2016  FINDINGS: Heart size is borderline. Pulmonary vasculature is unremarkable. Minimal left basilar atelectasis or infiltrate. No pleural effusion, or pneumothorax. No acute osseous process.      Minimal left basilar atelectasis or infiltrate. Borderline heart size.  This report was finalized on 5/31/2024 4:32 PM by Dr. Alli Reagan M.D on Workstation: [id number]       I ordered the above noted radiological studies. Independently reviewed and interpreted by me.  See dictation for official radiology interpretation.      PROCEDURES    Procedures      MEDICATIONS GIVEN IN ER    Medications   sodium chloride 0.9 % flush 10 mL (has no administration in time range)    aspirin tablet 325 mg (0 mg Oral Hold 5/31/24 1757)   sodium chloride 0.9 % bolus 1,000 mL (0 mL Intravenous Stopped 5/31/24 2000)   metoclopramide (REGLAN) injection 10 mg (10 mg Intravenous Given 5/31/24 2031)   famotidine (PEPCID) injection 20 mg (20 mg Intravenous Given 5/31/24 2031)         PROGRESS, DATA ANALYSIS, CONSULTS, AND MEDICAL DECISION MAKING    All labs have been independently reviewed and interpreted by me.  All radiology studies have been independently reviewed and interpreted by me and discussed with radiologist dictating the report.   EKG's independently reviewed and interpreted by me.  Discussion below represents my analysis of pertinent findings related to patient's condition, differential diagnosis, treatment plan and final disposition.    My differential diagnosis for chest pain includes but is not limited to:  Acute coronary syndrome, stable angina, unstable angina, aortic dissection, cardiac tamponade, coronary artery dissection, esophageal perforation, pulmonary embolism, tension pneumothorax, cocaine-associated chest pain, mediastinitis, cholecystitis, pancreatitis, myocarditis, pericarditis, aortic stenosis, asthma exacerbation, costochondritis, GERD, pneumonia, rib fracture, muscle strain, herpes zoster, pleurisy      ED Course as of 05/31/24 2303   Fri May 31, 2024   1757 WBC: 10.27 [DC]   1757 Hemoglobin: 14.6 [DC]   1757 Creatinine: 1.16 [DC]   1757 Sodium: 138 [DC]   1757 Potassium: 4.0 [DC]   1757 HS Troponin T: 9 [DC]   1915 Procalcitonin: 0.04 [DC]   1915 XR Chest 1 View  Radiology study independently interpreted by me and my findings are no pneumothorax.   [DC]   1919 HS Troponin T: 9 [DC]   1919 Troponin T Delta: 0 [DC]   1927 Patient was evaluated in ER x 2 within the past 4 days for similar symptoms.  Troponin x 2 today are within normal limits.  Chest x-ray showed atelectasis or infiltrate to left lower lobe.  I do not suspect pneumonia as patient has no fever, no cough,  normal white blood cell count and normal procalcitonin.  Do not have high clinical suspicion for ACS based on above.  He had normal CT of his abdomen and pelvis on 5/27 and has no significant abdominal tenderness on exam.  I do not believe he needs additional imaging at this time.  Discussed with patient the importance of following up with his primary care provider and established GI provider for ongoing management and evaluation of his symptoms.   [DC]      ED Course User Index  [DC] Kandi Hankins PA             AS OF 23:03 EDT VITALS:    BP - 131/90  HR - 70  TEMP - 96.4 °F (35.8 °C)  O2 SATS - 96%        DIAGNOSIS  Final diagnoses:   Atypical chest pain   Anxiety   Nausea and vomiting, unspecified vomiting type   History of gastroesophageal reflux (GERD)         DISPOSITION  ED Disposition       ED Disposition   Discharge    Condition   Stable    Comment   --                  Note Disclaimer: At [facility], we believe that sharing information builds trust and better relationships. You are receiving this note because you recently visited [facility]. It is possible you will see health information before a provider has talked with you about it. This kind of information can be easy to misunderstand. To help you fully understand what it means for your health, we urge you to discuss this note with your provider.         Kandi Hankins PA  05/31/24 [id number]

## 2024-05-31 NOTE — ED NOTES
Pt arrives to triage for chest pain. Pt also reports his extremities are weak and tingling. Symptoms started Sunday

## 2024-06-01 ENCOUNTER — HOSPITAL ENCOUNTER (EMERGENCY)
Facility: HOSPITAL | Age: 39
Discharge: HOME | End: 2024-06-01
Attending: EMERGENCY MEDICINE
Payer: MEDICAID

## 2024-06-01 ENCOUNTER — APPOINTMENT (OUTPATIENT)
Dept: GENERAL RADIOLOGY | Facility: HOSPITAL | Age: 39
End: 2024-06-01
Payer: MEDICAID

## 2024-06-01 VITALS
DIASTOLIC BLOOD PRESSURE: 82 MMHG | SYSTOLIC BLOOD PRESSURE: 136 MMHG | TEMPERATURE: 98.2 F | HEIGHT: 71 IN | BODY MASS INDEX: 40.6 KG/M2 | HEART RATE: 85 BPM | WEIGHT: 290 LBS | OXYGEN SATURATION: 95 % | RESPIRATION RATE: 16 BRPM

## 2024-06-01 DIAGNOSIS — R11.0 NAUSEA: ICD-10-CM

## 2024-06-01 DIAGNOSIS — R55 SYNCOPE, UNSPECIFIED SYNCOPE TYPE: Primary | ICD-10-CM

## 2024-06-01 LAB
ALBUMIN SERPL-MCNC: 4.5 G/DL (ref 3.5–5.2)
ALBUMIN/GLOB SERPL: 1.5 G/DL
ALP SERPL-CCNC: 73 U/L (ref 39–117)
ALT SERPL W P-5'-P-CCNC: 28 U/L (ref 1–41)
ANION GAP SERPL CALCULATED.3IONS-SCNC: 12.1 MMOL/L (ref 5–15)
AST SERPL-CCNC: 17 U/L (ref 1–40)
BASOPHILS # BLD AUTO: 0.06 10*3/MM3 (ref 0–0.2)
BASOPHILS NFR BLD AUTO: 0.5 % (ref 0–1.5)
BILIRUB SERPL-MCNC: 0.5 MG/DL (ref 0–1.2)
BUN SERPL-MCNC: 6 MG/DL (ref 6–20)
BUN/CREAT SERPL: 5.1 (ref 7–25)
CALCIUM SPEC-SCNC: 9.5 MG/DL (ref 8.6–10.5)
CHLORIDE SERPL-SCNC: 99 MMOL/L (ref 98–107)
CO2 SERPL-SCNC: 28.9 MMOL/L (ref 22–29)
CREAT SERPL-MCNC: 1.18 MG/DL (ref 0.76–1.27)
DEPRECATED RDW RBC AUTO: 39 FL (ref 37–54)
EGFRCR SERPLBLD CKD-EPI 2021: 80 ML/MIN/1.73
EOSINOPHIL # BLD AUTO: 0.41 10*3/MM3 (ref 0–0.4)
EOSINOPHIL NFR BLD AUTO: 3.7 % (ref 0.3–6.2)
ERYTHROCYTE [DISTWIDTH] IN BLOOD BY AUTOMATED COUNT: 13.2 % (ref 12.3–15.4)
GEN 5 2HR TROPONIN T REFLEX: 9 NG/L
GLOBULIN UR ELPH-MCNC: 3.1 GM/DL
GLUCOSE SERPL-MCNC: 126 MG/DL (ref 65–99)
HCT VFR BLD AUTO: 45.9 % (ref 37.5–51)
HGB BLD-MCNC: 15.1 G/DL (ref 13–17.7)
IMM GRANULOCYTES # BLD AUTO: 0.03 10*3/MM3 (ref 0–0.05)
IMM GRANULOCYTES NFR BLD AUTO: 0.3 % (ref 0–0.5)
LIPASE SERPL-CCNC: 24 U/L (ref 13–60)
LYMPHOCYTES # BLD AUTO: 1.48 10*3/MM3 (ref 0.7–3.1)
LYMPHOCYTES NFR BLD AUTO: 13.5 % (ref 19.6–45.3)
MAGNESIUM SERPL-MCNC: 2.2 MG/DL (ref 1.6–2.6)
MCH RBC QN AUTO: 27.1 PG (ref 26.6–33)
MCHC RBC AUTO-ENTMCNC: 32.9 G/DL (ref 31.5–35.7)
MCV RBC AUTO: 82.3 FL (ref 79–97)
MONOCYTES # BLD AUTO: 0.67 10*3/MM3 (ref 0.1–0.9)
MONOCYTES NFR BLD AUTO: 6.1 % (ref 5–12)
NEUTROPHILS NFR BLD AUTO: 75.9 % (ref 42.7–76)
NEUTROPHILS NFR BLD AUTO: 8.33 10*3/MM3 (ref 1.7–7)
NRBC BLD AUTO-RTO: 0 /100 WBC (ref 0–0.2)
PLATELET # BLD AUTO: 229 10*3/MM3 (ref 140–450)
PMV BLD AUTO: 10.5 FL (ref 6–12)
POTASSIUM SERPL-SCNC: 3.8 MMOL/L (ref 3.5–5.2)
PROT SERPL-MCNC: 7.6 G/DL (ref 6–8.5)
RBC # BLD AUTO: 5.58 10*6/MM3 (ref 4.14–5.8)
SODIUM SERPL-SCNC: 140 MMOL/L (ref 136–145)
TROPONIN T DELTA: -2 NG/L
TROPONIN T SERPL HS-MCNC: 11 NG/L
WBC NRBC COR # BLD AUTO: 10.98 10*3/MM3 (ref 3.4–10.8)

## 2024-06-01 PROCEDURE — 93005 ELECTROCARDIOGRAM TRACING: CPT | Performed by: PHYSICIAN ASSISTANT

## 2024-06-01 PROCEDURE — 71045 X-RAY EXAM CHEST 1 VIEW: CPT

## 2024-06-01 PROCEDURE — 99284 EMERGENCY DEPT VISIT MOD MDM: CPT

## 2024-06-01 PROCEDURE — 93010 ELECTROCARDIOGRAM REPORT: CPT | Performed by: INTERNAL MEDICINE

## 2024-06-01 PROCEDURE — 84484 ASSAY OF TROPONIN QUANT: CPT | Performed by: PHYSICIAN ASSISTANT

## 2024-06-01 PROCEDURE — 80053 COMPREHEN METABOLIC PANEL: CPT | Performed by: PHYSICIAN ASSISTANT

## 2024-06-01 PROCEDURE — 85025 COMPLETE CBC W/AUTO DIFF WBC: CPT | Performed by: PHYSICIAN ASSISTANT

## 2024-06-01 PROCEDURE — 83690 ASSAY OF LIPASE: CPT | Performed by: PHYSICIAN ASSISTANT

## 2024-06-01 PROCEDURE — 83735 ASSAY OF MAGNESIUM: CPT | Performed by: PHYSICIAN ASSISTANT

## 2024-06-01 PROCEDURE — 36415 COLL VENOUS BLD VENIPUNCTURE: CPT

## 2024-06-01 RX ORDER — METOCLOPRAMIDE 10 MG/1
10 TABLET ORAL EVERY 8 HOURS PRN
Qty: 12 TABLET | Refills: 0 | OUTPATIENT
Start: 2024-06-01 | End: 2024-06-06

## 2024-06-01 RX ORDER — METOCLOPRAMIDE 10 MG/1
10 TABLET ORAL EVERY 8 HOURS PRN
Qty: 12 TABLET | Refills: 0 | Status: SHIPPED | OUTPATIENT
Start: 2024-06-01 | End: 2024-06-01

## 2024-06-01 NOTE — ED PROVIDER NOTES
MD ATTESTATION NOTE  I supervised care provided by the midlevel provider. We have discussed this patient's history, physical exam, and treatment plan. I have reviewed the midlevel provider's note and I agree with the midlevel provider's findings and plan of care.   SHARED VISIT: This visit was performed by BOTH a physician and an APC. The substantive portion of the medical decision making was performed by this attesting physician who made or approved the management plan and takes responsibility for patient management. All studies in the APC note (if performed) were independently interpreted by me.   I have personally had a face to face encounter with the patient.     PCP: Jamilah Hernandez APRN  Patient Care Team:  Jamilah Hernandez APRN as PCP - General (Nurse Practitioner)     Dale Marr is a 39 y.o. male who presents to the ED c/o syncopal event.  Patient reports that he started feeling poorly last Sunday, was nauseous, had several episodes of emesis and then while in the shower passed out.  Patient denied any injury occurred.  Patient reports he is continue to feel nauseous since then, had 1 additional episode of emesis, has been having diarrhea, complains of crampy abdominal pain coming and going.  Patient endorses shakes chills and generalized bodyaches, runny nose and congestion, no cough, no fevers.    On exam:  General: NAD.  Head: NCAT.  ENT: nares patent, no scleral icterus  Neck: Supple, trachea midline.  Cardiac: regular rate and rhythm.  Lungs: normal effort, clear to auscultation bilaterally  Abdomen: Soft, nondistended, NTTP, no rebound tenderness, no guarding or rigidity.   Extremities: Moves all extremities well, no peripheral edema  Neuro: alert, MAEW, follows commands  Psych: calm, cooperative  Skin: Warm, dry.    Medical Decision Making:  After the initial H&P, I discussed pertinent information from history and physical exam with patient/family.  Discussed differential diagnosis.   Discussed plan for ED evaluation/work-up/treatment.  All questions answered.  Patient/family is agreeable with plan.    ED Course as of 06/01/24 1355   Sat Jun 01, 2024   1308 My differential diagnosis for syncope includes but is not limited to:  Vasovagal reflex - situational stimulus, micturition, defecation, cough, sneezing, swallowing, postprandial state, react sinus hypersensitivity  Vascular-prolonged recumbency, sudden postural change, prolonged standing, hypovolemia, vasodilator drugs, autonomic neuropathy, adrenal insufficiency, subclavian steal, pulmonary embolism  Cardiac -arrhythmia, heart block, myocardial infarction, aortic stenosis, cardiac myxoma, cardiac, LV Dysfunction, Aortic Dissection, Pulmonary Hypertension, Pulmonary Stenosis, Pacemaker Failure  CNS-seizure, hypoxia, hypoglycemia, TIA,(basal vertebral), hydrocephalus     [JG]   1346 EKG independently viewed and contemporaneously interpreted by ED physician. Time: 1339.  Rate 69.  Interpretation: Normal sinus rhythm, normal axis, normal QRS, no acute ST changes. [JG]      ED Course User Index  [JG] Harshad Caal MD       Diagnosis  Final diagnoses:   None

## 2024-06-01 NOTE — ED NOTES
The patient verbalized understanding of discharge instructions, medications and follow up.  Ambulated from the ER with a steady gait.  No further needs at this time.     0 = independent

## 2024-06-01 NOTE — ED NOTES
Pt via PV from home with c/o syncope episode.  Pt reports multiple occurrences since starting Skyrizi     Testicle pain and abd pain, nausea

## 2024-06-01 NOTE — ED PROVIDER NOTES
" EMERGENCY DEPARTMENT ENCOUNTER  Room Number:  05/05  PCP: Jamilah Hernandez APRN  Independent Historians: Patient      HPI:  Chief Complaint: had concerns including Syncope.       A complete HPI/ROS/PMH/PSH/SH/FH are unobtainable due to: None    Chronic or social conditions impacting patient care (Social Determinants of Health): None      Context: Dale Marr is a 39 y.o. male with a medical history of anxiety, asthma, GERD, opioid abuse, kidney stones, prediabetes, psoriasis who presents to the ED c/o acute syncopal episode that occurred this morning.  States that he was taking a hot shower when he felt nauseated, got out of shower, felt lightheaded and passed out.  Denies any injury.  States that he had a passout episode 1 week ago on Sunday, was evaluated at [facility].  States that he was feeling nauseated yesterday and came to this ER and was evaluated.  He had 1 episode of vomiting today, states he has been having diarrhea daily for the past week.  He also reports some body aches and chills.  He denies any cough fever or urinary symptoms.  He states earlier in the week he had a \"gripping sensation\" in his testicles, has not recurred in several days.      Review of prior external notes (non-ED) -and- Review of prior external test results outside of this encounter:  ER visit at [facility] on 5/27/2024 for abdominal pain nausea vomiting, got dizzy and passed out on the bathroom..  Patient left the ER prior to completion of workup because he was hungry and wanted to get something to eat.  He left AGAINST MEDICAL ADVICE.  Patient then came to this ER on 5/27/2024 for near syncopal episode with nausea vomiting and diarrhea.  Serial troponins were 14 and 18, he was diagnosed with nausea anxiety and orthostasis and near syncope.     Patient evaluated in this ER yesterday states he got sick, nausea and vomiting, took a hot shower and then felt like he was in a pass out.  Workup " "unremarkable and he was discharged    PAST MEDICAL HISTORY  Active Ambulatory Problems     Diagnosis Date Noted   • No Active Ambulatory Problems     Resolved Ambulatory Problems     Diagnosis Date Noted   • No Resolved Ambulatory Problems     Past Medical History:   Diagnosis Date   • Anxiety    • Asthma    • Bilateral leg edema    • GERD (gastroesophageal reflux disease)    • History of opioid abuse    • Kidney stones    • Lung nodule    • Prediabetes    • Psoriasis          PAST SURGICAL HISTORY  Past Surgical History:   Procedure Laterality Date   • APPENDECTOMY     • EXTRACORPOREAL SHOCK WAVE LITHOTRIPSY (ESWL) Right 10/16/2023    Procedure: RIGHT ESWL;  Surgeon: Immanuel Bryan MD;  Location: [facility];  Service: Urology;  Laterality: Right;         FAMILY HISTORY  Family History   Problem Relation Age of Onset   • Malig Hyperthermia Neg Hx          SOCIAL HISTORY  Social History     Socioeconomic History   • Marital status: Single   Tobacco Use   • Smoking status: Never   Vaping Use   • Vaping status: Never Used   Substance and Sexual Activity   • Alcohol use: Yes     Comment: OCCASIONALLY   • Drug use: Yes     Types: Marijuana, Heroin, Oxycodone     Comment: Heroin \"that I snort\" /LAST TIME 1 YEAR AGO. USES MARIJUANA DAILY   • Sexual activity: Defer         ALLERGIES  Phenazopyridine      REVIEW OF SYSTEMS  Review of Systems  Included in HPI  All systems reviewed and negative except for those discussed in HPI.      PHYSICAL EXAM    I have reviewed the triage vital signs and nursing notes.    ED Triage Vitals   Temp Heart Rate Resp BP SpO2   06/01/24 1239 06/01/24 1239 06/01/24 1239 06/01/24 1322 06/01/24 1239   98.2 °F (36.8 °C) 88 16 154/90 99 %      Temp src Heart Rate Source Patient Position BP Location FiO2 (%)   06/01/24 1239 -- 06/01/24 1409 -- --   Tympanic  Lying         Physical Exam  GENERAL: alert, no acute distress  SKIN: Warm, dry  HENT: Normocephalic, atraumatic  EYES: no scleral " icterus  CV: regular rhythm, regular rate  RESPIRATORY: normal effort, lungs clear  ABDOMEN: nondistended soft nontender normal bowel sounds no guarding or rigidity  MUSCULOSKELETAL: no deformity  NEURO: alert, moves all extremities, follows commands            LAB RESULTS  Recent Results (from the past 24 hour(s))   Comprehensive Metabolic Panel    Collection Time: 06/01/24  1:21 PM    Specimen: Blood   Result Value Ref Range    Glucose 126 (H) 65 - 99 mg/dL    BUN 6 6 - 20 mg/dL    Creatinine 1.18 0.76 - 1.27 mg/dL    Sodium 140 136 - 145 mmol/L    Potassium 3.8 3.5 - 5.2 mmol/L    Chloride 99 98 - 107 mmol/L    CO2 28.9 22.0 - 29.0 mmol/L    Calcium 9.5 8.6 - 10.5 mg/dL    Total Protein 7.6 6.0 - 8.5 g/dL    Albumin 4.5 3.5 - 5.2 g/dL    ALT (SGPT) 28 1 - 41 U/L    AST (SGOT) 17 1 - 40 U/L    Alkaline Phosphatase 73 39 - 117 U/L    Total Bilirubin 0.5 0.0 - 1.2 mg/dL    Globulin 3.1 gm/dL    A/G Ratio 1.5 g/dL    BUN/Creatinine Ratio 5.1 (L) 7.0 - 25.0    Anion Gap 12.1 5.0 - 15.0 mmol/L    eGFR 80.0 >60.0 mL/min/1.73   High Sensitivity Troponin T    Collection Time: 06/01/24  1:21 PM    Specimen: Blood   Result Value Ref Range    HS Troponin T 11 <22 ng/L   Magnesium    Collection Time: 06/01/24  1:21 PM    Specimen: Blood   Result Value Ref Range    Magnesium 2.2 1.6 - 2.6 mg/dL   CBC Auto Differential    Collection Time: 06/01/24  1:21 PM    Specimen: Blood   Result Value Ref Range    WBC 10.98 (H) 3.40 - 10.80 10*3/mm3    RBC 5.58 4.14 - 5.80 10*6/mm3    Hemoglobin 15.1 13.0 - 17.7 g/dL    Hematocrit 45.9 37.5 - 51.0 %    MCV 82.3 79.0 - 97.0 fL    MCH 27.1 26.6 - 33.0 pg    MCHC 32.9 31.5 - 35.7 g/dL    RDW 13.2 12.3 - 15.4 %    RDW-SD 39.0 37.0 - 54.0 fl    MPV 10.5 6.0 - 12.0 fL    Platelets 229 140 - 450 10*3/mm3    Neutrophil % 75.9 42.7 - 76.0 %    Lymphocyte % 13.5 (L) 19.6 - 45.3 %    Monocyte % 6.1 5.0 - 12.0 %    Eosinophil % 3.7 0.3 - 6.2 %    Basophil % 0.5 0.0 - 1.5 %    Immature Grans % 0.3 0.0  - 0.5 %    Neutrophils, Absolute 8.33 (H) 1.70 - 7.00 10*3/mm3    Lymphocytes, Absolute 1.48 0.70 - 3.10 10*3/mm3    Monocytes, Absolute 0.67 0.10 - 0.90 10*3/mm3    Eosinophils, Absolute 0.41 (H) 0.00 - 0.40 10*3/mm3    Basophils, Absolute 0.06 0.00 - 0.20 10*3/mm3    Immature Grans, Absolute 0.03 0.00 - 0.05 10*3/mm3    nRBC 0.0 0.0 - 0.2 /100 WBC   Lipase    Collection Time: 06/01/24  1:21 PM    Specimen: Blood   Result Value Ref Range    Lipase 24 13 - 60 U/L   ECG 12 Lead Syncope    Collection Time: 06/01/24  1:39 PM   Result Value Ref Range    QT Interval 390 ms    QTC Interval 418 ms   High Sensitivity Troponin T 2Hr    Collection Time: 06/01/24  4:01 PM    Specimen: Blood   Result Value Ref Range    HS Troponin T 9 <22 ng/L    Troponin T Delta -2 >=-4 - <+4 ng/L         RADIOLOGY  XR Chest 1 View    Result Date: 6/1/2024  XR CHEST 1 VW-  HISTORY: Male who is 39 years-old, syncope  TECHNIQUE: Frontal view of the chest  COMPARISON: 5/31/2024, correlated with CT from 9/26/2022  FINDINGS: Heart, mediastinum and pulmonary vasculature are unremarkable. Small likely scarring or atelectasis peripherally at the left base. Previous CT demonstrated left lower lobe nodule is not well characterized radiographically, follow-up CT can be obtained as indicated for direct comparison with the prior exam. No focal pulmonary consolidation, pleural effusion, or pneumothorax. No acute osseous process.      As described.  This report was finalized on 6/1/2024 1:27 PM by Dr. Alli Reagan M.D on Workstation: [id number]         MEDICATIONS GIVEN IN ER  Medications - No data to display      ORDERS PLACED DURING THIS VISIT:  Orders Placed This Encounter   Procedures   • XR Chest 1 View   • Comprehensive Metabolic Panel   • High Sensitivity Troponin T   • Magnesium   • CBC Auto Differential   • Lipase   • High Sensitivity Troponin T 2Hr   • Monitor Blood Pressure   • Orthostatic Vitals   • Continuous Pulse Oximetry   • ECG 12 Lead  Syncope   • CBC & Differential         OUTPATIENT MEDICATION MANAGEMENT:  No current Epic-ordered facility-administered medications on file.     Current Outpatient Medications Ordered in Epic   Medication Sig Dispense Refill   • metoclopramide (REGLAN) 10 MG tablet Take 1 tablet by mouth Every 8 (Eight) Hours As Needed (nausea). 12 tablet 0   • ALBUTEROL IN Inhale 2 puffs 4 (Four) Times a Day As Needed.     • busPIRone (BUSPAR) 10 MG tablet Take 1 tablet by mouth 3 (Three) Times a Day As Needed.     • hydroCHLOROthiazide (MICROZIDE) 12.5 MG capsule Take 1 capsule by mouth Every Morning.     • HYDROcodone-acetaminophen (Norco) 5-325 MG per tablet Take 1 tablet by mouth Every 6 (Six) Hours As Needed for Severe Pain. 8 tablet 0   • hydrOXYzine pamoate (VISTARIL) 25 MG capsule Take 1 capsule by mouth 3 (Three) Times a Day As Needed.     • ketoconazole (NIZORAL) 2 % cream Daily As Needed.     • ondansetron ODT (ZOFRAN-ODT) 4 MG disintegrating tablet Place 1 tablet on the tongue 4 (Four) Times a Day As Needed for Nausea. 12 tablet 0   • pantoprazole (PROTONIX) 40 MG EC tablet Take 1 tablet by mouth Daily.     • tamsulosin (FLOMAX) 0.4 MG capsule 24 hr capsule Take 1 capsule by mouth Daily. 14 capsule 0         PROCEDURES  Procedures            PROGRESS, DATA ANALYSIS, CONSULTS, AND MEDICAL DECISION MAKING  All labs have been independently interpreted by me.  All radiology studies have been reviewed by me. All EKG's have been independently viewed and interpreted by me.  Discussion below represents my analysis of pertinent findings related to patient's condition, differential diagnosis, treatment plan and final disposition.    DIFFERENTIAL          ED Course as of 06/01/24 1945   Sat Jun 01, 2024   1308 My differential diagnosis for syncope includes but is not limited to:  Vasovagal reflex - situational stimulus, micturition, defecation, cough, sneezing, swallowing, postprandial state, react sinus  hypersensitivity  Vascular-prolonged recumbency, sudden postural change, prolonged standing, hypovolemia, vasodilator drugs, autonomic neuropathy, adrenal insufficiency, subclavian steal, pulmonary embolism  Cardiac -arrhythmia, heart block, myocardial infarction, aortic stenosis, cardiac myxoma, cardiac, LV Dysfunction, Aortic Dissection, Pulmonary Hypertension, Pulmonary Stenosis, Pacemaker Failure  CNS-seizure, hypoxia, hypoglycemia, TIA,(basal vertebral), hydrocephalus     [JG]   1346 EKG independently viewed and contemporaneously interpreted by ED physician. Time: 1339.  Rate 69.  Interpretation: Normal sinus rhythm, normal axis, normal QRS, no acute ST changes. [JG]   1944 WBC(!): 10.98 [KA]   1944 Hemoglobin: 15.1 [KA]   1944 Glucose(!): 126 [KA]   1944 Creatinine: 1.18 [KA]   1944 Magnesium: 2.2 [KA]   1944 HS Troponin T: 11 [KA]   1944 HS Troponin T: 9 [KA]   1944 Lipase: 24 [KA]      ED Course User Index  [JG] Harshad Caal MD  [KA] Carlita Anderson PA-C       Patient is well-appearing with normal vitals, no acute distress, orthostatics negative.  Labs unremarkable, ischemic workup negative with negative serial troponins and unremarkable EKG.  Chest x-ray clear.  No signs of electrolyte abnormality or dehydration, no anemia.  I do think the patient has a component of anxiety.  Recommend close follow-up with PCP within 2 business days, return to ER for new or worsening symptoms or recurrence or any concerns      AS OF 19:44 EDT VITALS:    BP - 136/82  HR - 85  TEMP - 98.2 °F (36.8 °C) (Tympanic)  O2 SATS - 95%    COMPLEXITY OF CARE  Admission was considered but after careful review of the patient's presentation, physical examination, diagnostic results, and response to treatment the patient may be safely discharged with outpatient follow-up.      DIAGNOSIS  Final diagnoses:   Syncope, unspecified syncope type   Nausea         DISPOSITION  ED Disposition       ED Disposition   Discharge    Condition    Good    Comment   --                  FOLLOW UP  MaryJamilah dutton, APRN  [address] [address]  [phone]    In 2 days      Your dermatologist    Call in 1 day                Please note that portions of this document were completed with a voice recognition program.    Note Disclaimer: At [facility], we believe that sharing information builds trust and better relationships. You are receiving this note because you recently visited [facility]. It is possible you will see health information before a provider has talked with you about it. This kind of information can be easy to misunderstand. To help you fully understand what it means for your health, we urge you to discuss this note with your provider.         Carlita Anderson PA-C  06/01/24 1947

## 2024-06-01 NOTE — DISCHARGE INSTRUCTIONS
Stay well hydrated  Do not take any additional doses of Skyrizi until you discuss with your dermatologist  Follow up with your primary care provider within 2 business days

## 2024-06-03 LAB
QT INTERVAL: 359 MS
QT INTERVAL: 390 MS
QTC INTERVAL: 418 MS
QTC INTERVAL: 440 MS

## 2024-06-06 ENCOUNTER — HOSPITAL ENCOUNTER (EMERGENCY)
Facility: HOSPITAL | Age: 39
Discharge: HOME | End: 2024-06-06
Attending: EMERGENCY MEDICINE | Admitting: EMERGENCY MEDICINE
Payer: MEDICAID

## 2024-06-06 VITALS
RESPIRATION RATE: 18 BRPM | TEMPERATURE: 99.4 F | SYSTOLIC BLOOD PRESSURE: 111 MMHG | OXYGEN SATURATION: 95 % | HEART RATE: 71 BPM | WEIGHT: 290 LBS | HEIGHT: 71 IN | BODY MASS INDEX: 40.6 KG/M2 | DIASTOLIC BLOOD PRESSURE: 59 MMHG

## 2024-06-06 DIAGNOSIS — R55 SYNCOPE AND COLLAPSE: Primary | ICD-10-CM

## 2024-06-06 LAB
ALBUMIN SERPL-MCNC: 4.3 G/DL (ref 3.5–5.2)
ALBUMIN/GLOB SERPL: 1.3 G/DL
ALP SERPL-CCNC: 66 U/L (ref 39–117)
ALT SERPL W P-5'-P-CCNC: 27 U/L (ref 1–41)
AMPHET+METHAMPHET UR QL: NEGATIVE
ANION GAP SERPL CALCULATED.3IONS-SCNC: 9 MMOL/L (ref 5–15)
AST SERPL-CCNC: 14 U/L (ref 1–40)
BARBITURATES UR QL SCN: NEGATIVE
BASOPHILS # BLD AUTO: 0.04 10*3/MM3 (ref 0–0.2)
BASOPHILS NFR BLD AUTO: 0.5 % (ref 0–1.5)
BENZODIAZ UR QL SCN: POSITIVE
BILIRUB SERPL-MCNC: 0.4 MG/DL (ref 0–1.2)
BUN SERPL-MCNC: 7 MG/DL (ref 6–20)
BUN/CREAT SERPL: 6.8 (ref 7–25)
CALCIUM SPEC-SCNC: 9.5 MG/DL (ref 8.6–10.5)
CANNABINOIDS SERPL QL: NEGATIVE
CHLORIDE SERPL-SCNC: 101 MMOL/L (ref 98–107)
CK SERPL-CCNC: 62 U/L (ref 20–200)
CO2 SERPL-SCNC: 28 MMOL/L (ref 22–29)
COCAINE UR QL: NEGATIVE
CREAT SERPL-MCNC: 1.03 MG/DL (ref 0.76–1.27)
CRP SERPL-MCNC: 0.54 MG/DL (ref 0–0.5)
D-LACTATE SERPL-SCNC: 1.6 MMOL/L (ref 0.5–2)
DEPRECATED RDW RBC AUTO: 38.3 FL (ref 37–54)
EGFRCR SERPLBLD CKD-EPI 2021: 94.2 ML/MIN/1.73
EOSINOPHIL # BLD AUTO: 0.26 10*3/MM3 (ref 0–0.4)
EOSINOPHIL NFR BLD AUTO: 3.4 % (ref 0.3–6.2)
ERYTHROCYTE [DISTWIDTH] IN BLOOD BY AUTOMATED COUNT: 13 % (ref 12.3–15.4)
ETHANOL BLD-MCNC: <10 MG/DL (ref 0–10)
ETHANOL UR QL: <0.01 %
FENTANYL UR-MCNC: POSITIVE NG/ML
GLOBULIN UR ELPH-MCNC: 3.2 GM/DL
GLUCOSE SERPL-MCNC: 127 MG/DL (ref 65–99)
HCT VFR BLD AUTO: 44.3 % (ref 37.5–51)
HGB BLD-MCNC: 14.4 G/DL (ref 13–17.7)
HOLD SPECIMEN: NORMAL
HOLD SPECIMEN: NORMAL
IMM GRANULOCYTES # BLD AUTO: 0.01 10*3/MM3 (ref 0–0.05)
IMM GRANULOCYTES NFR BLD AUTO: 0.1 % (ref 0–0.5)
LYMPHOCYTES # BLD AUTO: 1.46 10*3/MM3 (ref 0.7–3.1)
LYMPHOCYTES NFR BLD AUTO: 19.2 % (ref 19.6–45.3)
MAGNESIUM SERPL-MCNC: 2.3 MG/DL (ref 1.6–2.6)
MCH RBC QN AUTO: 26.7 PG (ref 26.6–33)
MCHC RBC AUTO-ENTMCNC: 32.5 G/DL (ref 31.5–35.7)
MCV RBC AUTO: 82 FL (ref 79–97)
METHADONE UR QL SCN: NEGATIVE
MONOCYTES # BLD AUTO: 0.66 10*3/MM3 (ref 0.1–0.9)
MONOCYTES NFR BLD AUTO: 8.7 % (ref 5–12)
NEUTROPHILS NFR BLD AUTO: 5.18 10*3/MM3 (ref 1.7–7)
NEUTROPHILS NFR BLD AUTO: 68.1 % (ref 42.7–76)
NRBC BLD AUTO-RTO: 0 /100 WBC (ref 0–0.2)
NT-PROBNP SERPL-MCNC: <36 PG/ML (ref 0–450)
OPIATES UR QL: POSITIVE
OXYCODONE UR QL SCN: NEGATIVE
PLATELET # BLD AUTO: 241 10*3/MM3 (ref 140–450)
PMV BLD AUTO: 10.9 FL (ref 6–12)
POTASSIUM SERPL-SCNC: 3.9 MMOL/L (ref 3.5–5.2)
PROT SERPL-MCNC: 7.5 G/DL (ref 6–8.5)
QT INTERVAL: 327 MS
QTC INTERVAL: 411 MS
RBC # BLD AUTO: 5.4 10*6/MM3 (ref 4.14–5.8)
SODIUM SERPL-SCNC: 138 MMOL/L (ref 136–145)
TROPONIN T SERPL HS-MCNC: 10 NG/L
TSH SERPL DL<=0.05 MIU/L-ACNC: 2.45 UIU/ML (ref 0.27–4.2)
WBC NRBC COR # BLD AUTO: 7.61 10*3/MM3 (ref 3.4–10.8)
WHOLE BLOOD HOLD COAG: NORMAL
WHOLE BLOOD HOLD SPECIMEN: NORMAL

## 2024-06-06 PROCEDURE — 84484 ASSAY OF TROPONIN QUANT: CPT

## 2024-06-06 PROCEDURE — 86140 C-REACTIVE PROTEIN: CPT | Performed by: EMERGENCY MEDICINE

## 2024-06-06 PROCEDURE — 80053 COMPREHEN METABOLIC PANEL: CPT

## 2024-06-06 PROCEDURE — 93010 ELECTROCARDIOGRAM REPORT: CPT | Performed by: STUDENT IN AN ORGANIZED HEALTH CARE EDUCATION/TRAINING PROGRAM

## 2024-06-06 PROCEDURE — 93005 ELECTROCARDIOGRAM TRACING: CPT

## 2024-06-06 PROCEDURE — 80307 DRUG TEST PRSMV CHEM ANLYZR: CPT | Performed by: EMERGENCY MEDICINE

## 2024-06-06 PROCEDURE — 82077 ASSAY SPEC XCP UR&BREATH IA: CPT | Performed by: EMERGENCY MEDICINE

## 2024-06-06 PROCEDURE — 83605 ASSAY OF LACTIC ACID: CPT | Performed by: EMERGENCY MEDICINE

## 2024-06-06 PROCEDURE — 84443 ASSAY THYROID STIM HORMONE: CPT | Performed by: EMERGENCY MEDICINE

## 2024-06-06 PROCEDURE — 83880 ASSAY OF NATRIURETIC PEPTIDE: CPT | Performed by: EMERGENCY MEDICINE

## 2024-06-06 PROCEDURE — 99284 EMERGENCY DEPT VISIT MOD MDM: CPT

## 2024-06-06 PROCEDURE — 25810000003 SODIUM CHLORIDE 0.9 % SOLUTION: Performed by: EMERGENCY MEDICINE

## 2024-06-06 PROCEDURE — 85025 COMPLETE CBC W/AUTO DIFF WBC: CPT

## 2024-06-06 PROCEDURE — 83735 ASSAY OF MAGNESIUM: CPT

## 2024-06-06 PROCEDURE — 82550 ASSAY OF CK (CPK): CPT | Performed by: EMERGENCY MEDICINE

## 2024-06-06 PROCEDURE — 36415 COLL VENOUS BLD VENIPUNCTURE: CPT

## 2024-06-06 RX ORDER — SODIUM CHLORIDE 0.9 % (FLUSH) 0.9 %
10 SYRINGE (ML) INJECTION AS NEEDED
Status: DISCONTINUED | OUTPATIENT
Start: 2024-06-06 | End: 2024-06-06 | Stop reason: HOSPADM

## 2024-06-06 RX ADMIN — SODIUM CHLORIDE 1000 ML: 9 INJECTION, SOLUTION INTRAVENOUS at 14:44

## 2024-06-06 NOTE — DISCHARGE INSTRUCTIONS
Rest at home avoiding any particularly strenuous activity today and tomorrow.     Eat small, frequent meals and drink plenty of fluids. Stop taking medications not prescribed to you.    Monitor for any signs of recurrent symptoms or worsening and see your primary doctor to discuss your ER visit while returning to the ER if any concerns as we discussed.

## 2024-06-06 NOTE — ED PROVIDER NOTES
EMERGENCY DEPARTMENT ENCOUNTER    Room Number:  22/22  PCP: Jamilah Hernandez APRN  Independent Historians: Patient    HPI:  Chief Complaint: had concerns including Syncope.      A complete HPI/ROS/PMH/PSH/SH/FH are unobtainable due to: None    Chronic or social conditions impacting patient care (Social Determinants of Health): None      Context: Dale Marr is a 39 y.o. male with a medical history of syncope, substance abuse, anxiety, gastroparesis who presents to the ED c/o acute episode of syncope while sitting on his couch.  Patient does admit to taking a dose of lorazepam he obtained from a friend because it helps with some of his anxiety.  He was seated on the couch when he felt lightheaded his heart started racing and he passed out.  Patient has been evaluated for syncope several different times at several different emergency departments in [facility].  Most recently patient was admitted to cardiology at [facility] and is currently wearing a Holter monitor.  He just left the [facility] this morning after being evaluated again for same.  Patient denies any other substances besides lorazepam.  He denies any fevers, vomiting, diarrhea.  He describes having waves of pain sometimes to start in the shoulder and go down his breast and into his groin bilaterally.  Those occur randomly as well.  He is awaiting GI follow-up which is scheduled.  He also has an appointment with Dr. Parks.    Review of prior external notes (non-ED) -and- Review of prior external test results outside of this encounter: Patient had a stress test and an echocardiogram done just within the last couple days at [facility].  His evaluation was negative.  He did have a Holter monitor placed prior to discharge this morning    Prescription drug monitoring program review:     N/A    PAST MEDICAL HISTORY  Active Ambulatory Problems     Diagnosis Date Noted    No Active Ambulatory Problems     Resolved Ambulatory Problems      "Diagnosis Date Noted    No Resolved Ambulatory Problems     Past Medical History:   Diagnosis Date    Anxiety     Asthma     Bilateral leg edema     GERD (gastroesophageal reflux disease)     History of opioid abuse     Kidney stones     Lung nodule     Prediabetes     Psoriasis          PAST SURGICAL HISTORY  Past Surgical History:   Procedure Laterality Date    APPENDECTOMY      EXTRACORPOREAL SHOCK WAVE LITHOTRIPSY (ESWL) Right 10/16/2023    Procedure: RIGHT ESWL;  Surgeon: Immanuel Bryan MD;  Location: [facility];  Service: Urology;  Laterality: Right;         FAMILY HISTORY  Family History   Problem Relation Age of Onset    Malig Hyperthermia Neg Hx          SOCIAL HISTORY  Social History     Socioeconomic History    Marital status: Single   Tobacco Use    Smoking status: Never   Vaping Use    Vaping status: Never Used   Substance and Sexual Activity    Alcohol use: Yes     Comment: OCCASIONALLY    Drug use: Yes     Types: Marijuana, Heroin, Oxycodone     Comment: Heroin \"that I snort\" /LAST TIME 1 YEAR AGO. USES MARIJUANA DAILY    Sexual activity: Defer         ALLERGIES  Phenazopyridine        REVIEW OF SYSTEMS  Review of Systems  Included in HPI  All systems reviewed and negative except for those discussed in HPI.      PHYSICAL EXAM    I have reviewed the triage vital signs and nursing notes.    ED Triage Vitals   Temp Heart Rate Resp BP SpO2   06/06/24 1247 06/06/24 1247 06/06/24 1247 06/06/24 1307 06/06/24 1247   99.4 °F (37.4 °C) (!) 124 18 150/93 97 %      Temp src Heart Rate Source Patient Position BP Location FiO2 (%)   06/06/24 1247 06/06/24 1247 -- -- --   Tympanic Monitor          Physical Exam  GENERAL: Cooperative and conversant disheveled obese male, alert, no acute distress  SKIN: Warm, dry, no rashes  HENT: Normocephalic, atraumatic  EYES: no scleral icterus  CV: regular rhythm, regular rate, no murmur  RESPIRATORY: normal effort, lungs clear, no wheezing, no rhonchi, positive " gynecomastia  ABDOMEN: soft, nontender, nondistended,  obese, no rebound, no guarding  MUSCULOSKELETAL: no deformity, no lower extremity edema  NEURO: alert, moves all extremities, follows commands                                                                   LAB RESULTS  Recent Results (from the past 24 hour(s))   ECG 12 Lead ED Triage Standing Order; Syncope    Collection Time: 06/06/24  1:03 PM   Result Value Ref Range    QT Interval 327 ms    QTC Interval 411 ms   Comprehensive Metabolic Panel    Collection Time: 06/06/24  1:26 PM    Specimen: Arm, Left; Blood   Result Value Ref Range    Glucose 127 (H) 65 - 99 mg/dL    BUN 7 6 - 20 mg/dL    Creatinine 1.03 0.76 - 1.27 mg/dL    Sodium 138 136 - 145 mmol/L    Potassium 3.9 3.5 - 5.2 mmol/L    Chloride 101 98 - 107 mmol/L    CO2 28.0 22.0 - 29.0 mmol/L    Calcium 9.5 8.6 - 10.5 mg/dL    Total Protein 7.5 6.0 - 8.5 g/dL    Albumin 4.3 3.5 - 5.2 g/dL    ALT (SGPT) 27 1 - 41 U/L    AST (SGOT) 14 1 - 40 U/L    Alkaline Phosphatase 66 39 - 117 U/L    Total Bilirubin 0.4 0.0 - 1.2 mg/dL    Globulin 3.2 gm/dL    A/G Ratio 1.3 g/dL    BUN/Creatinine Ratio 6.8 (L) 7.0 - 25.0    Anion Gap 9.0 5.0 - 15.0 mmol/L    eGFR 94.2 >60.0 mL/min/1.73   Magnesium    Collection Time: 06/06/24  1:26 PM    Specimen: Arm, Left; Blood   Result Value Ref Range    Magnesium 2.3 1.6 - 2.6 mg/dL   Single High Sensitivity Troponin T    Collection Time: 06/06/24  1:26 PM    Specimen: Arm, Left; Blood   Result Value Ref Range    HS Troponin T 10 <22 ng/L   Green Top (Gel)    Collection Time: 06/06/24  1:26 PM   Result Value Ref Range    Extra Tube Hold for add-ons.    Lavender Top    Collection Time: 06/06/24  1:26 PM   Result Value Ref Range    Extra Tube hold for add-on    Gold Top - SST    Collection Time: 06/06/24  1:26 PM   Result Value Ref Range    Extra Tube Hold for add-ons.    Light Blue Top    Collection Time: 06/06/24  1:26 PM   Result Value Ref Range    Extra Tube Hold for  add-ons.    CBC Auto Differential    Collection Time: 06/06/24  1:26 PM    Specimen: Arm, Left; Blood   Result Value Ref Range    WBC 7.61 3.40 - 10.80 10*3/mm3    RBC 5.40 4.14 - 5.80 10*6/mm3    Hemoglobin 14.4 13.0 - 17.7 g/dL    Hematocrit 44.3 37.5 - 51.0 %    MCV 82.0 79.0 - 97.0 fL    MCH 26.7 26.6 - 33.0 pg    MCHC 32.5 31.5 - 35.7 g/dL    RDW 13.0 12.3 - 15.4 %    RDW-SD 38.3 37.0 - 54.0 fl    MPV 10.9 6.0 - 12.0 fL    Platelets 241 140 - 450 10*3/mm3    Neutrophil % 68.1 42.7 - 76.0 %    Lymphocyte % 19.2 (L) 19.6 - 45.3 %    Monocyte % 8.7 5.0 - 12.0 %    Eosinophil % 3.4 0.3 - 6.2 %    Basophil % 0.5 0.0 - 1.5 %    Immature Grans % 0.1 0.0 - 0.5 %    Neutrophils, Absolute 5.18 1.70 - 7.00 10*3/mm3    Lymphocytes, Absolute 1.46 0.70 - 3.10 10*3/mm3    Monocytes, Absolute 0.66 0.10 - 0.90 10*3/mm3    Eosinophils, Absolute 0.26 0.00 - 0.40 10*3/mm3    Basophils, Absolute 0.04 0.00 - 0.20 10*3/mm3    Immature Grans, Absolute 0.01 0.00 - 0.05 10*3/mm3    nRBC 0.0 0.0 - 0.2 /100 WBC   C-reactive Protein    Collection Time: 06/06/24  1:26 PM    Specimen: Arm, Left; Blood   Result Value Ref Range    C-Reactive Protein 0.54 (H) 0.00 - 0.50 mg/dL   BNP    Collection Time: 06/06/24  1:26 PM    Specimen: Arm, Left; Blood   Result Value Ref Range    proBNP <36.0 0.0 - 450.0 pg/mL   CK    Collection Time: 06/06/24  1:26 PM    Specimen: Arm, Left; Blood   Result Value Ref Range    Creatine Kinase 62 20 - 200 U/L   TSH    Collection Time: 06/06/24  1:26 PM    Specimen: Arm, Left; Blood   Result Value Ref Range    TSH 2.450 0.270 - 4.200 uIU/mL   Ethanol    Collection Time: 06/06/24  1:26 PM    Specimen: Arm, Left; Blood   Result Value Ref Range    Ethanol <10 0 - 10 mg/dL    Ethanol % <0.010 %   Lactic Acid, Plasma    Collection Time: 06/06/24  2:44 PM    Specimen: Blood   Result Value Ref Range    Lactate 1.6 0.5 - 2.0 mmol/L   Urine Drug Screen - Urine, Clean Catch    Collection Time: 06/06/24  5:05 PM    Specimen:  Urine, Clean Catch   Result Value Ref Range    Amphet/Methamphet, Screen Negative Negative    Barbiturates Screen, Urine Negative Negative    Benzodiazepine Screen, Urine Positive (A) Negative    Cocaine Screen, Urine Negative Negative    Opiate Screen Positive (A) Negative    THC, Screen, Urine Negative Negative    Methadone Screen, Urine Negative Negative    Oxycodone Screen, Urine Negative Negative    Fentanyl, Urine Positive (A) Negative         RADIOLOGY  No Radiology Exams Resulted Within Past 24 Hours      MEDICATIONS GIVEN IN ER  Medications   sodium chloride 0.9 % bolus 1,000 mL (0 mL Intravenous Stopped 6/6/24 1826)         ORDERS PLACED DURING THIS VISIT:  Orders Placed This Encounter   Procedures    [place] Draw    Comprehensive Metabolic Panel    Magnesium    Single High Sensitivity Troponin T    CBC Auto Differential    C-reactive Protein    Lactic Acid, Plasma    BNP    CK    TSH    Ethanol    Urine Drug Screen - Urine, Clean Catch    Undress & Gown    Continuous Pulse Oximetry    Vital Signs    Orthostatic Blood Pressure    Orthostatic Vitals    POC Glucose Once    ECG 12 Lead ED Triage Standing Order; Syncope    Telemetry Scan    Telemetry Scan    CBC & Differential    Green Top (Gel)    Lavender Top    Gold Top - SST    Light Blue Top         OUTPATIENT MEDICATION MANAGEMENT:  No current Epic-ordered facility-administered medications on file.     Current Outpatient Medications Ordered in Epic   Medication Sig Dispense Refill    ALBUTEROL IN Inhale 2 puffs 4 (Four) Times a Day As Needed.      ketoconazole (NIZORAL) 2 % cream Daily As Needed.      ondansetron ODT (ZOFRAN-ODT) 4 MG disintegrating tablet Place 1 tablet on the tongue 4 (Four) Times a Day As Needed for Nausea. 12 tablet 0    pantoprazole (PROTONIX) 40 MG EC tablet Take 1 tablet by mouth Daily.           PROCEDURES  Procedures            PROGRESS, DATA ANALYSIS, CONSULTS, AND MEDICAL DECISION MAKING  All labs have been independently  interpreted by me.  All radiology studies have been reviewed by me. All EKG's have been independently viewed and interpreted by me.  Discussion below represents my analysis of pertinent findings related to patient's condition, differential diagnosis, treatment plan and final disposition.    Differential diagnosis includes but is not limited to   The differential diagnosis for syncope, collapse, or even near syncope/lightheadedness is quite broad and includes but is not limited to: hypoglycemia, orthostasis, vasovagal syncope, seizure, cardiac syncope, PE, electrolyte disturbances, sepsis, profound anemia, aortic dissection, severe aortic stenosis, stroke, sah, gi bleeding, intoxication and medication effects.    This patient presents with syncope or near-syncope. Seizure is less likely given the history and exam (lack of postictal period). No neurologic deficits on exam indicating a stroke, and no signs of head trauma or injury. Given no signs of trauma or neurologic deficit, I will withhold further imaging of the head per ACEP Choosing Wisely Recommendations. Additionally, the [facility] clinical policy on syncope evaluation recommends laboratory testing and advanced investigative testing such as echocardiography or head CT scanning need not be routinely performed unless guided by specific findings in the history or physical examination.          .    Clinical Scores:              Thai Syncope Risk score (for 30 day events): 0   Applicable to patients =16 years old presenting =24 hours of syncope. Do not use if: prolonged (>5 min) LOC, change in mental status from baseline, obvious witnessed seizure, major trauma, intoxication, language barrier, or head trauma causing LOC. This calculator is externally validated, according to data presented at the Society for Academic Emergency Medicine Annual Meeting 2018.  Nine measures:  Vasovagal symptom predisposition (Triggered by being in a warm crowded place, prolonged  standing, fear, emotion, or pain) (-1)  Heart disease history CAD, atrial fibrillation or flutter, CHF, valvular disease (+1)  Any systolic Blood Pressure reading <90 mmHg or >180 mmHg (+2)  Troponin elevated (+2)  QRS Axis abnormal (<30 or >100 degrees) (+1)  QRS Duration >130 ms (+1)  QTc >480 ms (+2)  ED diagnosis based on eval:   Vasovagal Syncope (-2)  Cardiac Syncope (+2)  (Neither (0))  ___________________________________  Score -3 to 0: Very low risk (serious adverse events <=1.9% in 30 days)  Score 1 to 3: Medium risk (serious adverse events >=3% in 30 days)  Score 4 to 5: High risk  Score >=6: Very high risk    ED Course as of 06/06/24 2139   Thu Jun 06, 2024   1631 EKG ER MD interpretation   Time: 13: 03  Rhythm and rate: Normal sinus rhythm at a rate of 95  Axis: Normal  P waves: Normal  QRS complexes: Normal  ST segments: no elevation nor depressions  T waves: T wave inversions in V1 and lead III  Comparison EKG is from a few days ago on June 1, 2024 with no significant changes [AR]   1829 I discussed all results with patient and need for reevaluation by his cardiologist.  Patient tolerating p.o. with vital signs of normalized.  He has had no return of symptoms.  I discussed patient's positive talk screen with him.  He denies fentanyl use.  He does describe being given Jeddo at the hospital last night and obtaining Ativan from a friend.  I discouraged him from taking any medication not prescribed to him particularly while having syncopal episodes evaluated. [AR]      ED Course User Index  [AR] Leola Barnes MD             AS OF 21:39 EDT VITALS:    BP - 111/59  HR - 71  TEMP - 99.4 °F (37.4 °C) (Tympanic)  O2 SATS - 95%      COMPLEXITY OF CARE  Admission was considered but after careful review of the patient's presentation, physical examination, diagnostic results, and response to treatment the patient may be safely discharged with outpatient follow-up.    DIAGNOSIS  Final diagnoses:   Syncope  and collapse         DISPOSITION  ED Disposition       ED Disposition   Discharge    Condition   Stable    Comment   --                Please note that portions of this document were completed with a voice recognition program.    Note Disclaimer: At [facility], we believe that sharing information builds trust and better relationships. You are receiving this note because you recently visited [facility]. It is possible you will see health information before a provider has talked with you about it. This kind of information can be easy to misunderstand. To help you fully understand what it means for your health, we urge you to discuss this note with your provider.         Leola Barnes MD  06/06/24 2534

## 2024-06-06 NOTE — ED TRIAGE NOTES
Pt states that he was sitting on couch this am when he had syncopal episode. Pt states that his heart rate became elevated and he became sweaty. Pt was released from [facility] this am after being admitted a couple days. Pt had negative stress test

## 2024-06-14 ENCOUNTER — OFFICE (OUTPATIENT)
Dept: URBAN - METROPOLITAN AREA CLINIC 76 | Facility: CLINIC | Age: 39
End: 2024-06-14
Payer: MEDICAID

## 2024-06-14 VITALS
HEART RATE: 77 BPM | HEIGHT: 70 IN | WEIGHT: 282 LBS | DIASTOLIC BLOOD PRESSURE: 86 MMHG | SYSTOLIC BLOOD PRESSURE: 142 MMHG

## 2024-06-14 DIAGNOSIS — R13.10 DYSPHAGIA, UNSPECIFIED: ICD-10-CM

## 2024-06-14 DIAGNOSIS — K92.0 HEMATEMESIS: ICD-10-CM

## 2024-06-14 DIAGNOSIS — K21.9 GASTRO-ESOPHAGEAL REFLUX DISEASE WITHOUT ESOPHAGITIS: ICD-10-CM

## 2024-06-14 PROCEDURE — 99214 OFFICE O/P EST MOD 30 MIN: CPT | Performed by: NURSE PRACTITIONER

## 2024-06-14 RX ORDER — OMEPRAZOLE 40 MG/1
80 CAPSULE, DELAYED RELEASE ORAL
Qty: 60 | Refills: 2 | Status: ACTIVE
Start: 2024-06-14

## 2024-06-14 RX ORDER — SUCRALFATE ORAL 1 G/10ML
SUSPENSION ORAL
Qty: 1200 | Refills: 5 | Status: COMPLETED
Start: 2024-06-14 | End: 2024-08-02

## 2024-08-02 ENCOUNTER — OFFICE (OUTPATIENT)
Dept: URBAN - METROPOLITAN AREA CLINIC 76 | Facility: CLINIC | Age: 39
End: 2024-08-02
Payer: MEDICAID

## 2024-08-02 VITALS
SYSTOLIC BLOOD PRESSURE: 124 MMHG | OXYGEN SATURATION: 98 % | HEART RATE: 90 BPM | HEIGHT: 70 IN | WEIGHT: 291 LBS | DIASTOLIC BLOOD PRESSURE: 84 MMHG

## 2024-08-02 DIAGNOSIS — R13.10 DYSPHAGIA, UNSPECIFIED: ICD-10-CM

## 2024-08-02 DIAGNOSIS — R11.0 NAUSEA: ICD-10-CM

## 2024-08-02 DIAGNOSIS — K21.9 GASTRO-ESOPHAGEAL REFLUX DISEASE WITHOUT ESOPHAGITIS: ICD-10-CM

## 2024-08-02 PROCEDURE — 99214 OFFICE O/P EST MOD 30 MIN: CPT | Performed by: INTERNAL MEDICINE

## 2024-08-02 RX ORDER — SUCRALFATE 1 G/10ML
SUSPENSION ORAL
Qty: 1000 | Refills: 6 | Status: ACTIVE
Start: 2024-08-02

## 2024-08-12 ENCOUNTER — TELEPHONE (OUTPATIENT)
Age: 39
End: 2024-08-12
Payer: MEDICAID

## 2024-08-12 NOTE — TELEPHONE ENCOUNTER
Looking for a detox center, pt is looking for medically assisted detox. Referred pt to the [facility] for detox. Provided the eval number [phone].

## 2025-04-28 ENCOUNTER — HOSPITAL ENCOUNTER (EMERGENCY)
Facility: HOSPITAL | Age: 40
Discharge: HOME | End: 2025-04-28
Attending: EMERGENCY MEDICINE
Payer: MEDICAID

## 2025-04-28 ENCOUNTER — APPOINTMENT (OUTPATIENT)
Dept: GENERAL RADIOLOGY | Facility: HOSPITAL | Age: 40
End: 2025-04-28
Payer: MEDICAID

## 2025-04-28 VITALS
OXYGEN SATURATION: 98 % | HEART RATE: 80 BPM | SYSTOLIC BLOOD PRESSURE: 126 MMHG | TEMPERATURE: 98.4 F | DIASTOLIC BLOOD PRESSURE: 86 MMHG | WEIGHT: 280 LBS | HEIGHT: 70 IN | BODY MASS INDEX: 40.09 KG/M2 | RESPIRATION RATE: 18 BRPM

## 2025-04-28 DIAGNOSIS — S82.455A CLOSED NONDISPLACED COMMINUTED FRACTURE OF SHAFT OF LEFT FIBULA, INITIAL ENCOUNTER: Primary | ICD-10-CM

## 2025-04-28 PROCEDURE — 73610 X-RAY EXAM OF ANKLE: CPT

## 2025-04-28 PROCEDURE — 73590 X-RAY EXAM OF LOWER LEG: CPT

## 2025-04-28 PROCEDURE — 73560 X-RAY EXAM OF KNEE 1 OR 2: CPT

## 2025-04-28 PROCEDURE — 99283 EMERGENCY DEPT VISIT LOW MDM: CPT

## 2025-04-28 RX ORDER — IBUPROFEN 600 MG/1
600 TABLET, FILM COATED ORAL EVERY 6 HOURS PRN
Qty: 30 TABLET | Refills: 0 | Status: SHIPPED | OUTPATIENT
Start: 2025-04-28

## 2025-04-28 RX ORDER — METOPROLOL TARTRATE 25 MG/1
25 TABLET, FILM COATED ORAL
COMMUNITY

## 2025-04-28 RX ORDER — ACETAMINOPHEN 500 MG
500 TABLET ORAL EVERY 6 HOURS PRN
Qty: 30 TABLET | Refills: 0 | Status: SHIPPED | OUTPATIENT
Start: 2025-04-28

## 2025-04-28 RX ORDER — ACETAMINOPHEN 500 MG
500 TABLET ORAL ONCE
Status: COMPLETED | OUTPATIENT
Start: 2025-04-28 | End: 2025-04-28

## 2025-04-28 RX ADMIN — ACETAMINOPHEN 500 MG: 500 TABLET, FILM COATED ORAL at 14:27

## 2025-04-28 NOTE — ED PROVIDER NOTES
" EMERGENCY DEPARTMENT ENCOUNTER      Room Number:  Room/bed info not found  PCP: Jamilah Hernandez APRN  Independent Historians: {Historian:68712::\"Patient\"}  Patient Care Team:  Jamilah Hernandez APRN as PCP - General (Nurse Practitioner)       HPI:  Chief Complaint: Leg pain    A complete HPI/ROS/PMH/PSH/SH/FH are unobtainable due to: {EM_Unobtainable History (Optional):17903::\"None\"}    Chronic or social conditions impacting patient care (Social Determinants of Health): {EM_SDOH (Optional):16000::\"None\"}      Context: Dale Marr is a 40 y.o. male with a PMH significant for GERD, anxiety who presents to the ED c/o acute ***      Upon review of prior external notes (non-ED) -and- Review of prior external test results outside of this encounter it appears the patient was evaluated in the office with cardiology for SVT on 1/22/2025.  The patient had a normal CMP on 1/16/2025 and a normal lipase on that date.      PAST MEDICAL HISTORY  Active Ambulatory Problems     Diagnosis Date Noted    No Active Ambulatory Problems     Resolved Ambulatory Problems     Diagnosis Date Noted    No Resolved Ambulatory Problems     Past Medical History:   Diagnosis Date    Anxiety     Asthma     Bilateral leg edema     GERD (gastroesophageal reflux disease)     History of opioid abuse     Kidney stones     Lung nodule     Prediabetes     Psoriasis          PAST SURGICAL HISTORY  Past Surgical History:   Procedure Laterality Date    APPENDECTOMY      EXTRACORPOREAL SHOCK WAVE LITHOTRIPSY (ESWL) Right 10/16/2023    Procedure: RIGHT ESWL;  Surgeon: Immanuel Bryan MD;  Location: [facility];  Service: Urology;  Laterality: Right;         FAMILY HISTORY  Family History   Problem Relation Age of Onset    Malig Hyperthermia Neg Hx          SOCIAL HISTORY  Social History     Socioeconomic History    Marital status: Single   Tobacco Use    Smoking status: Never   Vaping Use    Vaping status: Never Used   Substance and Sexual " "Activity    Alcohol use: Yes     Comment: OCCASIONALLY    Drug use: Yes     Types: Marijuana, Heroin, Oxycodone     Comment: Heroin \"that I snort\" /LAST TIME 1 YEAR AGO. USES MARIJUANA DAILY    Sexual activity: Defer         ALLERGIES  Phenazopyridine      REVIEW OF SYSTEMS  Included in HPI  All systems reviewed and negative except for those discussed in HPI.      PHYSICAL EXAM    I have reviewed the triage vital signs and nursing notes.    ED Triage Vitals [04/28/25 1259]   Temp Heart Rate Resp BP SpO2   100.2 °F (37.9 °C) 82 16 -- 95 %      Temp src Heart Rate Source Patient Position BP Location FiO2 (%)   Tympanic Monitor -- -- --       Physical Exam    Vital signs and nursing notes reviewed.      PPE: I wore a surgical mask throughout my encounters with the pt. I performed hand hygiene on entry into the pt room and upon exit.     LAB RESULTS  No results found for this or any previous visit (from the past 24 hours).      RADIOLOGY  No Radiology Exams Resulted Within Past 24 Hours      MEDICATIONS GIVEN IN ER  Medications - No data to display      ORDERS PLACED DURING THIS VISIT:  No orders of the defined types were placed in this encounter.        OUTPATIENT MEDICATION MANAGEMENT:  No current Epic-ordered facility-administered medications on file.     Current Outpatient Medications Ordered in Epic   Medication Sig Dispense Refill    ALBUTEROL IN Inhale 2 puffs 4 (Four) Times a Day As Needed.      ketoconazole (NIZORAL) 2 % cream Daily As Needed.      ondansetron ODT (ZOFRAN-ODT) 4 MG disintegrating tablet Place 1 tablet on the tongue 4 (Four) Times a Day As Needed for Nausea. 12 tablet 0    pantoprazole (PROTONIX) 40 MG EC tablet Take 1 tablet by mouth Daily.           PROCEDURES  Procedures      {EM_CC (Optional):19963}      PROGRESS, DATA ANALYSIS, CONSULTS, AND MEDICAL DECISION MAKING  All labs have been independently interpreted by me.  All radiology studies have been reviewed by me. All EKG's have been " independently viewed and interpreted by me.  Discussion below represents my analysis of pertinent findings related to patient's condition, differential diagnosis, treatment plan and final disposition.    Patient presentation and evaluation most consistent with ***    DIFFERENTIAL DIAGNOSIS INCLUDE BUT NOT LIMITED TO:     ***    Clinical Scores: N/A***             ***I rechecked the patient.  I discussed the patient's labs, radiology findings (including all incidental findings), diagnosis, and plan for discharge.  A repeat exam reveals no new worrisome changes from my initial exam findings.  The patient understands that the fact that they are being discharged does not denote that nothing is abnormal, it indicates that no clinical emergency is present and that they must follow-up as directed in order to properly maintain their health.  Follow-up instructions (specifically listed below) and return to ER precautions were given at this time.  I specifically instructed the patient to follow-up with their PCP.  The patient understands and agrees with the plan, and is ready for discharge.  All questions answered.    ***I rechecked the patient.  I discussed the patient's labs, radiology findings (including all incidental findings), diagnosis, and plan for admission. The patient understands and agrees with the plan.      AS OF 13:03 EDT VITALS:    BP -    HR - 82  TEMP - 100.2 °F (37.9 °C) (Tympanic)  O2 SATS - 95%    COMPLEXITY OF CARE  {Admission Statement:80995}      DIAGNOSIS  Final diagnoses:   None         DISPOSITION  ED Disposition       None             Please note that portions of this document were completed with a voice recognition program.    Note Disclaimer: At [facility], we believe that sharing information builds trust and better relationships. You are receiving this note because you recently visited [facility]. It is possible you will see health information before a provider has talked with you about  it. This kind of information can be easy to misunderstand. To help you fully understand what it means for your health, we urge you to discuss this note with your provider.
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
## 2025-04-28 NOTE — DISCHARGE INSTRUCTIONS
Please follow-up with your PCP.    Rest.  Increase fluid intake.  Ice as needed.  Apply warm compress several times per day.  Elevate to reduce swelling.  Use splint and crutches for support.  Ibuprofen and Tylenol for pain    Although you are being discharged in the ED today, I encourage you to return for worsening symptoms.  Things can, and do, change such a treatment at home with medication may not be adequate.  Specifically I recommend returning for chest pain or discomfort, difficulty breathing, persistent vomiting or difficulty holding down liquids or medications, fever > 102.0 F, or any other worsening or alarming symptoms.     Take meds as prescribed.     You have been evaluated in the emergency department for your presenting symptoms and deemed appropriate for discharge home.  Understand that your health care does not end here today.  It is important that your primary care physician be made aware of your visit.  I recommend calling your primary care provider in the next 48 hours to arrange follow-up care.  Your primary care provider needs to review your treatment and recovery to ensure that no further treatment is necessary.  Additional testing or procedures may be necessary as an outpatient at the discretion of your primary care provider.    I also recommend following up with your PCP for recheck of your blood pressure and treatment as needed.    
5
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
## (undated) DEVICE — GLV SURG BIOGEL LTX PF 7 1/2